# Patient Record
Sex: FEMALE | Race: BLACK OR AFRICAN AMERICAN | Employment: FULL TIME | URBAN - METROPOLITAN AREA
[De-identification: names, ages, dates, MRNs, and addresses within clinical notes are randomized per-mention and may not be internally consistent; named-entity substitution may affect disease eponyms.]

---

## 2017-10-27 ENCOUNTER — HOSPITAL ENCOUNTER (EMERGENCY)
Age: 27
Discharge: HOME | End: 2017-10-27
Attending: EMERGENCY MEDICINE
Payer: MEDICAID

## 2017-10-27 VITALS
HEART RATE: 74 BPM | RESPIRATION RATE: 16 BRPM | BODY MASS INDEX: 38.98 KG/M2 | WEIGHT: 220 LBS | DIASTOLIC BLOOD PRESSURE: 86 MMHG | HEIGHT: 63 IN | SYSTOLIC BLOOD PRESSURE: 138 MMHG | OXYGEN SATURATION: 100 % | TEMPERATURE: 98 F

## 2017-10-27 DIAGNOSIS — R51.9 FRONTAL HEADACHE: Primary | ICD-10-CM

## 2017-10-27 DIAGNOSIS — R03.0 ELEVATED BLOOD PRESSURE READING: ICD-10-CM

## 2017-10-27 PROCEDURE — 96375 TX/PRO/DX INJ NEW DRUG ADDON: CPT

## 2017-10-27 PROCEDURE — 96374 THER/PROPH/DIAG INJ IV PUSH: CPT

## 2017-10-27 PROCEDURE — 96361 HYDRATE IV INFUSION ADD-ON: CPT

## 2017-10-27 PROCEDURE — 74011250636 HC RX REV CODE- 250/636: Performed by: NURSE PRACTITIONER

## 2017-10-27 PROCEDURE — 99282 EMERGENCY DEPT VISIT SF MDM: CPT

## 2017-10-27 RX ORDER — BUTALBITAL, ACETAMINOPHEN AND CAFFEINE 300; 40; 50 MG/1; MG/1; MG/1
1 CAPSULE ORAL
Qty: 20 CAP | Refills: 0 | Status: SHIPPED | OUTPATIENT
Start: 2017-10-27 | End: 2018-06-18

## 2017-10-27 RX ORDER — AMLODIPINE BESYLATE 10 MG/1
TABLET ORAL
Refills: 0 | COMMUNITY
Start: 2017-08-13 | End: 2017-10-27

## 2017-10-27 RX ORDER — AMLODIPINE BESYLATE 10 MG/1
10 TABLET ORAL DAILY
Qty: 30 TAB | Refills: 1 | Status: SHIPPED | OUTPATIENT
Start: 2017-10-27 | End: 2018-06-05 | Stop reason: SDUPTHER

## 2017-10-27 RX ORDER — DIPHENHYDRAMINE HYDROCHLORIDE 50 MG/ML
25 INJECTION, SOLUTION INTRAMUSCULAR; INTRAVENOUS ONCE
Status: COMPLETED | OUTPATIENT
Start: 2017-10-27 | End: 2017-10-27

## 2017-10-27 RX ORDER — KETOROLAC TROMETHAMINE 30 MG/ML
15 INJECTION, SOLUTION INTRAMUSCULAR; INTRAVENOUS
Status: COMPLETED | OUTPATIENT
Start: 2017-10-27 | End: 2017-10-27

## 2017-10-27 RX ORDER — METOCLOPRAMIDE HYDROCHLORIDE 5 MG/ML
10 INJECTION INTRAMUSCULAR; INTRAVENOUS
Status: COMPLETED | OUTPATIENT
Start: 2017-10-27 | End: 2017-10-27

## 2017-10-27 RX ADMIN — METOCLOPRAMIDE 10 MG: 5 INJECTION, SOLUTION INTRAMUSCULAR; INTRAVENOUS at 21:31

## 2017-10-27 RX ADMIN — DIPHENHYDRAMINE HYDROCHLORIDE 25 MG: 50 INJECTION, SOLUTION INTRAMUSCULAR; INTRAVENOUS at 21:31

## 2017-10-27 RX ADMIN — KETOROLAC TROMETHAMINE 15 MG: 30 INJECTION, SOLUTION INTRAMUSCULAR at 21:31

## 2017-10-27 RX ADMIN — SODIUM CHLORIDE 1000 ML: 900 INJECTION, SOLUTION INTRAVENOUS at 21:32

## 2017-10-28 NOTE — ED NOTES
Pt discharged to home ambulatory and in company of mother  Discharge instructions provided via discussion and handout. Teaching to patient. Verbalized understanding. No questions voiced. Discharged with 2 RX.

## 2017-10-28 NOTE — ED PROVIDER NOTES
HPI Comments: 8:13 PM   28 y.o. female presents to ED C/O headache. Patient has a HX of HTN. Patient reports frontal headache x 2 days, does not improve with alieve which makes this headache different from those in the past.  patient repots mild photophobia associated with headache. Patient denies vision changes, N/V/D, dizziness, CP, SOB, fever, nuchal rigidity. Reports nasal congestion last night, no appetite change. LMP 10/19, denies risk of pregnancy. Patient is a nonsmoker. Patient denies any other symptoms or complaints. The history is provided by the patient. History limited by: No language barrier. Past Medical History:   Diagnosis Date    Hypertension        History reviewed. No pertinent surgical history. History reviewed. No pertinent family history. Social History     Social History    Marital status: SINGLE     Spouse name: N/A    Number of children: N/A    Years of education: N/A     Occupational History    Not on file. Social History Main Topics    Smoking status: Never Smoker    Smokeless tobacco: Never Used    Alcohol use No    Drug use: Not on file    Sexual activity: Not on file     Other Topics Concern    Not on file     Social History Narrative         ALLERGIES: Review of patient's allergies indicates no known allergies. Review of Systems   Constitutional: Negative for appetite change and fever. HENT: Positive for congestion. Negative for ear pain, rhinorrhea, sinus pressure and sore throat. Eyes: Positive for photophobia. Negative for visual disturbance. Respiratory: Negative for cough, shortness of breath and wheezing. Cardiovascular: Negative for chest pain and leg swelling. Gastrointestinal: Negative for abdominal pain, constipation, diarrhea, nausea and vomiting. Genitourinary: Negative for dysuria. Musculoskeletal: Negative for arthralgias and back pain. Neurological: Positive for headaches.  Negative for dizziness, syncope, light-headedness and numbness. Vitals:    10/27/17 2005 10/27/17 2207   BP: (!) 157/110 138/86   Pulse: 80 74   Resp: 16    Temp: 98 °F (36.7 °C)    SpO2: 100%    Weight: 99.8 kg (220 lb)    Height: 5' 3\" (1.6 m)             Physical Exam   Constitutional: She appears well-developed and well-nourished. No distress. HENT:   Right Ear: Hearing and external ear normal.   Left Ear: Hearing and external ear normal.   Nose: Right sinus exhibits frontal sinus tenderness. Right sinus exhibits no maxillary sinus tenderness. Left sinus exhibits no maxillary sinus tenderness and no frontal sinus tenderness. Mouth/Throat: Oropharynx is clear and moist.   Eyes: Conjunctivae and EOM are normal. Pupils are equal, round, and reactive to light. No nystagmus noted    Neck: Normal range of motion. Neck supple. No rigidity. Cardiovascular: Normal rate, regular rhythm and normal heart sounds. Pulmonary/Chest: Effort normal and breath sounds normal. No respiratory distress. She has no wheezes. She has no rales. Musculoskeletal: Normal range of motion. Neurological: She is alert. She has normal strength. No sensory deficit. She exhibits normal muscle tone. She displays no seizure activity. Coordination and gait normal. GCS eye subscore is 4. GCS verbal subscore is 5. GCS motor subscore is 6. Cranial nerves 2-12 checked and are intact, no unilateral weakness,  strength equal bilaterally   Skin: Skin is warm and dry. No rash noted. She is not diaphoretic. No erythema. No pallor. Nursing note and vitals reviewed.        MDM  Number of Diagnoses or Management Options  Elevated blood pressure reading:   Frontal headache:   Diagnosis management comments: Differential Diagnosis: tension HA, cluster HA, migraine, sinusitis, otitis media, SAH, epidural hematoma, subdural hematoma, temporal arteritis, brain tumor, trigeminal neuralgia, CVA, meningitis, encephalitis, TMJ, normal pressure hydrocephalus      Plan: migraine cocktail, patient is not ill appearing, no indication for lab work at this time, headache symptoms not consistent with concerning red flag findings, no indication for imagining at this time, will treat for pain and re-evaluate. 9:48 PM Patient as not been taking blood pressure medication x 3 weeks, because she was running low on it. Will refill prior to discharge and refer to PCP for further evaluation. 10:15 PM Patient reports headache has resolved, denies pain or photophobia at this time, patient's blood pressure is also now WNL. Patient educated about importance of medication compliance and PCP follow-up. Patient reports she understands. Patient educated about red flag symptoms of headache and reasons to return to the ED. Patient denies questions. -patient's mother is driving her home. ED Course       Procedures             RESULTS:    No orders to display       Labs Reviewed - No data to display    No results found for this or any previous visit (from the past 12 hour(s)). PROGRESS NOTE:   8:13 PM   Initial assessment completed. Written by Amy QUINONES    One or more blood pressure readings were noted elevated during the Pt's presentation in the emergency department this date. This abnormal reading has been cited in the Pt's diagnosis, and they have been encouraged to follow up with their primary care physician, or referred to a consultant for further evaluation and treatment. DISCHARGE NOTE:  10:16 PM   Jimmie Medico  results have been reviewed with her. She has been counseled regarding her diagnosis, treatment, and plan. She verbally conveys understanding and agreement of the signs, symptoms, diagnosis, treatment and prognosis and additionally agrees to follow up as discussed. She also agrees with the care-plan and conveys that all of her questions have been answered.   I have also provided discharge instructions for her that include: educational information regarding their diagnosis and treatment, and list of reasons why they would want to return to the ED prior to their follow-up appointment, should her condition change. CLINICAL IMPRESSION:    1. Frontal headache    2. Elevated blood pressure reading        AFTER VISIT PLAN:    Current Discharge Medication List      START taking these medications    Details   butalbital-acetaminophen-caff (FIORICET) -40 mg per capsule Take 1 Cap by mouth every four (4) hours as needed for Pain. Max Daily Amount: 6 Caps. Qty: 20 Cap, Refills: 0         CONTINUE these medications which have CHANGED    Details   amLODIPine (NORVASC) 10 mg tablet Take 1 Tab by mouth daily.   Qty: 30 Tab, Refills: 1              Follow-up Information     Follow up With Details Comments [facility] MD [facility] Schedule an appointment as soon as possible for a visit in 1 week Further evaluation [address] [place] [address] [phone]  [phone]      [facility] EMERGENCY DEPT Go in 1 day If symptoms worsen/ not improved  [address]  [phone]           Written by Marco QUINONES

## 2017-10-28 NOTE — DISCHARGE INSTRUCTIONS
Headache: Care Instructions  Your Care Instructions    Headaches have many possible causes. Most headaches aren't a sign of a more serious problem, and they will get better on their own. Home treatment may help you feel better faster. The doctor has checked you carefully, but problems can develop later. If you notice any problems or new symptoms, get medical treatment right away. Follow-up care is a key part of your treatment and safety. Be sure to make and go to all appointments, and call your doctor if you are having problems. It's also a good idea to know your test results and keep a list of the medicines you take. How can you care for yourself at home? · Do not drive if you have taken a prescription pain medicine. · Rest in a quiet, dark room until your headache is gone. Close your eyes and try to relax or go to sleep. Don't watch TV or read. · Put a cold, moist cloth or cold pack on the painful area for 10 to 20 minutes at a time. Put a thin cloth between the cold pack and your skin. · Use a warm, moist towel or a heating pad set on low to relax tight shoulder and neck muscles. · Have someone gently massage your neck and shoulders. · Take pain medicines exactly as directed. ¨ If the doctor gave you a prescription medicine for pain, take it as prescribed. ¨ If you are not taking a prescription pain medicine, ask your doctor if you can take an over-the-counter medicine. · Be careful not to take pain medicine more often than the instructions allow, because you may get worse or more frequent headaches when the medicine wears off. · Do not ignore new symptoms that occur with a headache, such as a fever, weakness or numbness, vision changes, or confusion. These may be signs of a more serious problem. To prevent headaches  · Keep a headache diary so you can figure out what triggers your headaches. Avoiding triggers may help you prevent headaches.  Record when each headache began, how long it lasted, and what the pain was like (throbbing, aching, stabbing, or dull). Write down any other symptoms you had with the headache, such as nausea, flashing lights or dark spots, or sensitivity to bright light or loud noise. Note if the headache occurred near your period. List anything that might have triggered the headache, such as certain foods (chocolate, cheese, wine) or odors, smoke, bright light, stress, or lack of sleep. · Find healthy ways to deal with stress. Headaches are most common during or right after stressful times. Take time to relax before and after you do something that has caused a headache in the past.  · Try to keep your muscles relaxed by keeping good posture. Check your jaw, face, neck, and shoulder muscles for tension, and try relaxing them. When sitting at a desk, change positions often, and stretch for 30 seconds each hour. · Get plenty of sleep and exercise. · Eat regularly and well. Long periods without food can trigger a headache. · Treat yourself to a massage. Some people find that regular massages are very helpful in relieving tension. · Limit caffeine by not drinking too much coffee, tea, or soda. But don't quit caffeine suddenly, because that can also give you headaches. · Reduce eyestrain from computers by blinking frequently and looking away from the computer screen every so often. Make sure you have proper eyewear and that your monitor is set up properly, about an arm's length away. · Seek help if you have depression or anxiety. Your headaches may be linked to these conditions. Treatment can both prevent headaches and help with symptoms of anxiety or depression. When should you call for help? Call 911 anytime you think you may need emergency care. For example, call if:  ? · You have signs of a stroke. These may include:  ¨ Sudden numbness, paralysis, or weakness in your face, arm, or leg, especially on only one side of your body. ¨ Sudden vision changes.   ¨ Sudden trouble speaking. ¨ Sudden confusion or trouble understanding simple statements. ¨ Sudden problems with walking or balance. ¨ A sudden, severe headache that is different from past headaches. ?Call your doctor now or seek immediate medical care if:  ? · You have a new or worse headache. ? · Your headache gets much worse. Where can you learn more? Go to http://nicol-gurdeep.info/. Enter M271 in the search box to learn more about \"Headache: Care Instructions. \"  Current as of: October 14, 2016  Content Version: 11.4  © 0565-9091 [employer]. Care instructions adapted under license by [employer] (which disclaims liability or warranty for this information). If you have questions about a medical condition or this instruction, always ask your healthcare professional. [address] any warranty or liability for your use of this information. Elevated Blood Pressure: Care Instructions  Your Care Instructions    Blood pressure is a measure of how hard the blood pushes against the walls of your arteries. It's normal for blood pressure to go up and down throughout the day. But if it stays up over time, you have high blood pressure. Two numbers tell you your blood pressure. The first number is the systolic pressure. It shows how hard the blood pushes when your heart is pumping. The second number is the diastolic pressure. It shows how hard the blood pushes between heartbeats, when your heart is relaxed and filling with blood. An ideal blood pressure in adults is less than 120/80 (say \"120 over 80\"). High blood pressure is 140/90 or higher. You have high blood pressure if your top number is 140 or higher or your bottom number is 90 or higher, or both. The main test for high blood pressure is simple, fast, and painless. To diagnose high blood pressure, your doctor will test your blood pressure at different times.  After testing your blood pressure, your doctor may ask you to test it again when you are home. If you are diagnosed with high blood pressure, you can work with your doctor to make a long-term plan to manage it. Follow-up care is a key part of your treatment and safety. Be sure to make and go to all appointments, and call your doctor if you are having problems. It's also a good idea to know your test results and keep a list of the medicines you take. How can you care for yourself at home? · Do not smoke. Smoking increases your risk for heart attack and stroke. If you need help quitting, talk to your doctor about stop-smoking programs and medicines. These can increase your chances of quitting for good. · Stay at a healthy weight. · Try to limit how much sodium you eat to less than 2,300 milligrams (mg) a day. Your doctor may ask you to try to eat less than 1,500 mg a day. · Be physically active. Get at least 30 minutes of exercise on most days of the week. Walking is a good choice. You also may want to do other activities, such as running, swimming, cycling, or playing tennis or team sports. · Avoid or limit alcohol. Talk to your doctor about whether you can drink any alcohol. · Eat plenty of fruits, vegetables, and low-fat dairy products. Eat less saturated and total fats. · Learn how to check your blood pressure at home. When should you call for help? Call your doctor now or seek immediate medical care if:  ? · Your blood pressure is much higher than normal (such as 180/110 or higher). ? · You think high blood pressure is causing symptoms such as:  ¨ Severe headache. ¨ Blurry vision. ? Watch closely for changes in your health, and be sure to contact your doctor if:  ? · You do not get better as expected. Where can you learn more? Go to http://nicol-gurdeep.info/. Enter E510 in the search box to learn more about \"Elevated Blood Pressure: Care Instructions. \"  Current as of: September 21, 2016  Content Version: 11.4  © 2475-7079 [employer]. Care instructions adapted under license by [employer] (which disclaims liability or warranty for this information). If you have questions about a medical condition or this instruction, always ask your healthcare professional. [place] [address] any warranty or liability for your use of this information.

## 2018-06-02 ENCOUNTER — HOSPITAL ENCOUNTER (EMERGENCY)
Age: 28
Discharge: HOME | End: 2018-06-03
Attending: EMERGENCY MEDICINE
Payer: MEDICAID

## 2018-06-02 DIAGNOSIS — R03.0 ELEVATED BLOOD PRESSURE READING: ICD-10-CM

## 2018-06-02 DIAGNOSIS — R10.13 ABDOMINAL PAIN, EPIGASTRIC: Primary | ICD-10-CM

## 2018-06-02 LAB
APPEARANCE UR: CLEAR
BILIRUB UR QL: NEGATIVE
COLOR UR: YELLOW
GLUCOSE UR STRIP.AUTO-MCNC: NEGATIVE MG/DL
HCG UR QL: NEGATIVE
HGB UR QL STRIP: NEGATIVE
KETONES UR QL STRIP.AUTO: NEGATIVE MG/DL
LEUKOCYTE ESTERASE UR QL STRIP.AUTO: NEGATIVE
NITRITE UR QL STRIP.AUTO: NEGATIVE
PH UR STRIP: 6.5 [PH] (ref 5–8)
PROT UR STRIP-MCNC: NEGATIVE MG/DL
SP GR UR REFRACTOMETRY: 1.02 (ref 1–1.03)
UROBILINOGEN UR QL STRIP.AUTO: 1 EU/DL (ref 0.2–1)

## 2018-06-02 PROCEDURE — 81003 URINALYSIS AUTO W/O SCOPE: CPT | Performed by: EMERGENCY MEDICINE

## 2018-06-02 PROCEDURE — 99284 EMERGENCY DEPT VISIT MOD MDM: CPT

## 2018-06-02 PROCEDURE — 81025 URINE PREGNANCY TEST: CPT | Performed by: EMERGENCY MEDICINE

## 2018-06-03 VITALS
HEART RATE: 69 BPM | RESPIRATION RATE: 19 BRPM | HEIGHT: 63 IN | OXYGEN SATURATION: 100 % | SYSTOLIC BLOOD PRESSURE: 167 MMHG | BODY MASS INDEX: 38.98 KG/M2 | DIASTOLIC BLOOD PRESSURE: 111 MMHG | WEIGHT: 220 LBS | TEMPERATURE: 98.5 F

## 2018-06-03 LAB
ALBUMIN SERPL-MCNC: 3.7 G/DL (ref 3.4–5)
ALBUMIN/GLOB SERPL: 0.9 {RATIO} (ref 0.8–1.7)
ALP SERPL-CCNC: 74 U/L (ref 45–117)
ALT SERPL-CCNC: 24 U/L (ref 13–56)
ANION GAP SERPL CALC-SCNC: 8 MMOL/L (ref 3–18)
AST SERPL-CCNC: 13 U/L (ref 15–37)
BASOPHILS # BLD: 0 K/UL (ref 0–0.06)
BASOPHILS NFR BLD: 0 % (ref 0–2)
BILIRUB SERPL-MCNC: 0.3 MG/DL (ref 0.2–1)
BUN SERPL-MCNC: 11 MG/DL (ref 7–18)
BUN/CREAT SERPL: 11 (ref 12–20)
CALCIUM SERPL-MCNC: 9 MG/DL (ref 8.5–10.1)
CHLORIDE SERPL-SCNC: 106 MMOL/L (ref 100–108)
CO2 SERPL-SCNC: 26 MMOL/L (ref 21–32)
CREAT SERPL-MCNC: 0.97 MG/DL (ref 0.6–1.3)
DIFFERENTIAL METHOD BLD: NORMAL
EOSINOPHIL # BLD: 0.2 K/UL (ref 0–0.4)
EOSINOPHIL NFR BLD: 2 % (ref 0–5)
ERYTHROCYTE [DISTWIDTH] IN BLOOD BY AUTOMATED COUNT: 14.3 % (ref 11.6–14.5)
GLOBULIN SER CALC-MCNC: 3.9 G/DL (ref 2–4)
GLUCOSE SERPL-MCNC: 114 MG/DL (ref 74–99)
HCT VFR BLD AUTO: 38.3 % (ref 35–45)
HGB BLD-MCNC: 13 G/DL (ref 12–16)
LIPASE SERPL-CCNC: 114 U/L (ref 73–393)
LYMPHOCYTES # BLD: 2.4 K/UL (ref 0.9–3.6)
LYMPHOCYTES NFR BLD: 33 % (ref 21–52)
MAGNESIUM SERPL-MCNC: 2.1 MG/DL (ref 1.6–2.6)
MCH RBC QN AUTO: 27.7 PG (ref 24–34)
MCHC RBC AUTO-ENTMCNC: 33.9 G/DL (ref 31–37)
MCV RBC AUTO: 81.5 FL (ref 74–97)
MONOCYTES # BLD: 0.6 K/UL (ref 0.05–1.2)
MONOCYTES NFR BLD: 9 % (ref 3–10)
NEUTS SEG # BLD: 4.1 K/UL (ref 1.8–8)
NEUTS SEG NFR BLD: 56 % (ref 40–73)
PLATELET # BLD AUTO: 364 K/UL (ref 135–420)
PMV BLD AUTO: 10.2 FL (ref 9.2–11.8)
POTASSIUM SERPL-SCNC: 3.8 MMOL/L (ref 3.5–5.5)
PROT SERPL-MCNC: 7.6 G/DL (ref 6.4–8.2)
RBC # BLD AUTO: 4.7 M/UL (ref 4.2–5.3)
SODIUM SERPL-SCNC: 140 MMOL/L (ref 136–145)
WBC # BLD AUTO: 7.4 K/UL (ref 4.6–13.2)

## 2018-06-03 PROCEDURE — 80053 COMPREHEN METABOLIC PANEL: CPT | Performed by: EMERGENCY MEDICINE

## 2018-06-03 PROCEDURE — 83690 ASSAY OF LIPASE: CPT | Performed by: EMERGENCY MEDICINE

## 2018-06-03 PROCEDURE — 74011250637 HC RX REV CODE- 250/637: Performed by: EMERGENCY MEDICINE

## 2018-06-03 PROCEDURE — 74011000250 HC RX REV CODE- 250: Performed by: EMERGENCY MEDICINE

## 2018-06-03 PROCEDURE — 83735 ASSAY OF MAGNESIUM: CPT | Performed by: EMERGENCY MEDICINE

## 2018-06-03 PROCEDURE — 85025 COMPLETE CBC W/AUTO DIFF WBC: CPT | Performed by: EMERGENCY MEDICINE

## 2018-06-03 RX ORDER — OMEPRAZOLE 20 MG/1
20 CAPSULE, DELAYED RELEASE ORAL DAILY
Qty: 28 CAP | Refills: 0 | Status: SHIPPED | OUTPATIENT
Start: 2018-06-03 | End: 2018-06-17

## 2018-06-03 RX ORDER — ONDANSETRON 4 MG/1
4 TABLET, ORALLY DISINTEGRATING ORAL
Qty: 15 TAB | Refills: 0 | Status: SHIPPED | OUTPATIENT
Start: 2018-06-03 | End: 2018-06-18

## 2018-06-03 RX ADMIN — PHENOBARBITAL, HYOSCYAMINE SULFATE, ATROPINE SULFATE, SCOPOLAMINE HYDROBROMIDE 50 ML: 16.2; .1037; .0194; .0065 ELIXIR ORAL at 02:11

## 2018-06-03 NOTE — DISCHARGE INSTRUCTIONS
Abdominal Pain: Care Instructions  Your Care Instructions    Abdominal pain has many possible causes. Some aren't serious and get better on their own in a few days. Others need more testing and treatment. If your pain continues or gets worse, you need to be rechecked and may need more tests to find out what is wrong. You may need surgery to correct the problem. Don't ignore new symptoms, such as fever, nausea and vomiting, urination problems, pain that gets worse, and dizziness. These may be signs of a more serious problem. Your doctor may have recommended a follow-up visit in the next 8 to 12 hours. If you are not getting better, you may need more tests or treatment. The doctor has checked you carefully, but problems can develop later. If you notice any problems or new symptoms, get medical treatment right away. Follow-up care is a key part of your treatment and safety. Be sure to make and go to all appointments, and call your doctor if you are having problems. It's also a good idea to know your test results and keep a list of the medicines you take. How can you care for yourself at home? · Rest until you feel better. · To prevent dehydration, drink plenty of fluids, enough so that your urine is light yellow or clear like water. Choose water and other caffeine-free clear liquids until you feel better. If you have kidney, heart, or liver disease and have to limit fluids, talk with your doctor before you increase the amount of fluids you drink. · If your stomach is upset, eat mild foods, such as rice, dry toast or crackers, bananas, and applesauce. Try eating several small meals instead of two or three large ones. · Wait until 48 hours after all symptoms have gone away before you have spicy foods, alcohol, and drinks that contain caffeine. · Do not eat foods that are high in fat. · Avoid anti-inflammatory medicines such as aspirin, ibuprofen (Advil, Motrin), and naproxen (Aleve).  These can cause stomach upset. Talk to your doctor if you take daily aspirin for another health problem. When should you call for help? Call 911 anytime you think you may need emergency care. For example, call if:  ? · You passed out (lost consciousness). ? · You pass maroon or very bloody stools. ? · You vomit blood or what looks like coffee grounds. ? · You have new, severe belly pain. ?Call your doctor now or seek immediate medical care if:  ? · Your pain gets worse, especially if it becomes focused in one area of your belly. ? · You have a new or higher fever. ? · Your stools are black and look like tar, or they have streaks of blood. ? · You have unexpected vaginal bleeding. ? · You have symptoms of a urinary tract infection. These may include:  ¨ Pain when you urinate. ¨ Urinating more often than usual.  ¨ Blood in your urine. ? · You are dizzy or lightheaded, or you feel like you may faint. ? Watch closely for changes in your health, and be sure to contact your doctor if:  ? · You are not getting better after 1 day (24 hours). Where can you learn more? Go to http://nicol-gurdeep.info/. Enter S741 in the search box to learn more about \"Abdominal Pain: Care Instructions. \"  Current as of: March 20, 2017  Content Version: 11.4  © 9424-0682 [employer]. Care instructions adapted under license by [employer] (which disclaims liability or warranty for this information). If you have questions about a medical condition or this instruction, always ask your healthcare professional. [place] [address] any warranty or liability for your use of this information. Elevated Blood Pressure: Care Instructions  Your Care Instructions    Blood pressure is a measure of how hard the blood pushes against the walls of your arteries. It's normal for blood pressure to go up and down throughout the day.  But if it stays up over time, you have high blood pressure. Two numbers tell you your blood pressure. The first number is the systolic pressure. It shows how hard the blood pushes when your heart is pumping. The second number is the diastolic pressure. It shows how hard the blood pushes between heartbeats, when your heart is relaxed and filling with blood. An ideal blood pressure in adults is less than 120/80 (say \"120 over 80\"). High blood pressure is 140/90 or higher. You have high blood pressure if your top number is 140 or higher or your bottom number is 90 or higher, or both. The main test for high blood pressure is simple, fast, and painless. To diagnose high blood pressure, your doctor will test your blood pressure at different times. After testing your blood pressure, your doctor may ask you to test it again when you are home. If you are diagnosed with high blood pressure, you can work with your doctor to make a long-term plan to manage it. Follow-up care is a key part of your treatment and safety. Be sure to make and go to all appointments, and call your doctor if you are having problems. It's also a good idea to know your test results and keep a list of the medicines you take. How can you care for yourself at home? · Do not smoke. Smoking increases your risk for heart attack and stroke. If you need help quitting, talk to your doctor about stop-smoking programs and medicines. These can increase your chances of quitting for good. · Stay at a healthy weight. · Try to limit how much sodium you eat to less than 2,300 milligrams (mg) a day. Your doctor may ask you to try to eat less than 1,500 mg a day. · Be physically active. Get at least 30 minutes of exercise on most days of the week. Walking is a good choice. You also may want to do other activities, such as running, swimming, cycling, or playing tennis or team sports. · Avoid or limit alcohol. Talk to your doctor about whether you can drink any alcohol.   · Eat plenty of fruits, vegetables, and low-fat dairy products. Eat less saturated and total fats. · Learn how to check your blood pressure at home. When should you call for help? Call your doctor now or seek immediate medical care if:  ? · Your blood pressure is much higher than normal (such as 180/110 or higher). ? · You think high blood pressure is causing symptoms such as:  ¨ Severe headache. ¨ Blurry vision. ? Watch closely for changes in your health, and be sure to contact your doctor if:  ? · You do not get better as expected. Where can you learn more? Go to http://nicol-gurdeep.info/. Enter D234 in the search box to learn more about \"Elevated Blood Pressure: Care Instructions. \"  Current as of: September 21, 2016  Content Version: 11.4  © 8018-5176 [employer]. Care instructions adapted under license by [employer] (which disclaims liability or warranty for this information). If you have questions about a medical condition or this instruction, always ask your healthcare professional. [address] any warranty or liability for your use of this information.

## 2018-06-03 NOTE — ED PROVIDER NOTES
EMERGENCY DEPARTMENT HISTORY AND PHYSICAL EXAM    1:30 AM      Date: 6/2/2018  Patient Name: Jose Luis Hurst    History of Presenting Illness     Chief Complaint   Patient presents with    Abdominal Pain         History Provided By: Patient    Chief Complaint: abd pain   Duration:  3 days  Timing:  intermittent  Location: epigastric region  Quality: Burning  Severity: Moderate  Modifying Factors: triggered 10-20 minutes after eating, worse with eating fatty/spicy/acidic food,better with laying down  Associated Symptoms: esophageal discomfort after eating      Additional History (Context): Jose Luis Hurst, a 28 y.o. Female, nonsmoker, non-alcohol drinker with h/o HTN, presents to the ED c/o intermittent, moderate severity, burning epigastric pain that normally starts 10-20 minutes after eating. Sx, associated with esophageal discomfort but not with n/v, have been ongoing for 3 days. Sx worsen with eating fatty/spicy/acidic food and improve with laying down. Pt admits to having taken zantac in the past (1 month ago when she did not have epigastric pain) but states that she has not recently tried any antiacid medication. She also presents with elevated blood pressure today and states that she has been noncompliant because her HTN medication is too strong and gives her a HA. Pt's LMP was 1 week ago. She does not currently have any cough, cold sx, vaginal pain, vaginal discharge or v/d. She has a PCP. PCP: Genie Sales NP    Current Outpatient Prescriptions   Medication Sig Dispense Refill    omeprazole (PRILOSEC) 20 mg capsule Take 1 Cap by mouth daily for 14 days. 28 Cap 0    ondansetron (ZOFRAN ODT) 4 mg disintegrating tablet Take 1 Tab by mouth every eight (8) hours as needed for Nausea. 15 Tab 0    amLODIPine (NORVASC) 10 mg tablet Take 1 Tab by mouth daily. 30 Tab 1    butalbital-acetaminophen-caff (FIORICET) -40 mg per capsule Take 1 Cap by mouth every four (4) hours as needed for Pain. Max Daily Amount: 6 Caps. 20 Cap 0       Past History     Past Medical History:  Past Medical History:   Diagnosis Date    Hypertension        Past Surgical History:  Past Surgical History:   Procedure Laterality Date    HX HEENT         Family History:  History reviewed. No pertinent family history. Social History:  Social History   Substance Use Topics    Smoking status: Never Smoker    Smokeless tobacco: Never Used    Alcohol use No       Allergies:  No Known Allergies      Review of Systems       Review of Systems   Constitutional: Negative for chills and fever. HENT: Negative for congestion, ear pain, rhinorrhea and sore throat. Eyes: Negative for pain and visual disturbance. Respiratory: Negative for cough and shortness of breath. Cardiovascular: Negative for chest pain and palpitations. Gastrointestinal: Positive for abdominal pain. Negative for diarrhea, nausea and vomiting. Genitourinary: Negative for flank pain, vaginal bleeding, vaginal discharge and vaginal pain. Musculoskeletal: Negative for back pain and neck pain. Neurological: Negative for syncope and headaches. Psychiatric/Behavioral: Negative for agitation. The patient is not nervous/anxious. All other systems reviewed and are negative.         Physical Exam     Visit Vitals    BP (!) 167/111    Pulse 69    Temp 98.5 °F (36.9 °C)    Resp 19    Ht 5' 3\" (1.6 m)    Wt 99.8 kg (220 lb)    SpO2 100%    BMI 38.97 kg/m2         Physical Exam      Diagnostic Study Results     Labs -  Recent Results (from the past 12 hour(s))   URINALYSIS W/ RFLX MICROSCOPIC    Collection Time: 06/02/18 11:30 PM   Result Value Ref Range    Color YELLOW      Appearance CLEAR      Specific gravity 1.017 1.005 - 1.030      pH (UA) 6.5 5.0 - 8.0      Protein NEGATIVE  NEG mg/dL    Glucose NEGATIVE  NEG mg/dL    Ketone NEGATIVE  NEG mg/dL    Bilirubin NEGATIVE  NEG      Blood NEGATIVE  NEG      Urobilinogen 1.0 0.2 - 1.0 EU/dL    Nitrites NEGATIVE  NEG      Leukocyte Esterase NEGATIVE  NEG     HCG URINE, QL    Collection Time: 06/02/18 11:33 PM   Result Value Ref Range    HCG urine, QL NEGATIVE  NEG     CBC WITH AUTOMATED DIFF    Collection Time: 06/03/18 12:10 AM   Result Value Ref Range    WBC 7.4 4.6 - 13.2 K/uL    RBC 4.70 4.20 - 5.30 M/uL    HGB 13.0 12.0 - 16.0 g/dL    HCT 38.3 35.0 - 45.0 %    MCV 81.5 74.0 - 97.0 FL    MCH 27.7 24.0 - 34.0 PG    MCHC 33.9 31.0 - 37.0 g/dL    RDW 14.3 11.6 - 14.5 %    PLATELET 892 722 - 710 K/uL    MPV 10.2 9.2 - 11.8 FL    NEUTROPHILS 56 40 - 73 %    LYMPHOCYTES 33 21 - 52 %    MONOCYTES 9 3 - 10 %    EOSINOPHILS 2 0 - 5 %    BASOPHILS 0 0 - 2 %    ABS. NEUTROPHILS 4.1 1.8 - 8.0 K/UL    ABS. LYMPHOCYTES 2.4 0.9 - 3.6 K/UL    ABS. MONOCYTES 0.6 0.05 - 1.2 K/UL    ABS. EOSINOPHILS 0.2 0.0 - 0.4 K/UL    ABS. BASOPHILS 0.0 0.0 - 0.06 K/UL    DF AUTOMATED     METABOLIC PANEL, COMPREHENSIVE    Collection Time: 06/03/18 12:10 AM   Result Value Ref Range    Sodium 140 136 - 145 mmol/L    Potassium 3.8 3.5 - 5.5 mmol/L    Chloride 106 100 - 108 mmol/L    CO2 26 21 - 32 mmol/L    Anion gap 8 3.0 - 18 mmol/L    Glucose 114 (H) 74 - 99 mg/dL    BUN 11 7.0 - 18 MG/DL    Creatinine 0.97 0.6 - 1.3 MG/DL    BUN/Creatinine ratio 11 (L) 12 - 20      GFR est AA >60 >60 ml/min/1.73m2    GFR est non-AA >60 >60 ml/min/1.73m2    Calcium 9.0 8.5 - 10.1 MG/DL    Bilirubin, total 0.3 0.2 - 1.0 MG/DL    ALT (SGPT) 24 13 - 56 U/L    AST (SGOT) 13 (L) 15 - 37 U/L    Alk.  phosphatase 74 45 - 117 U/L    Protein, total 7.6 6.4 - 8.2 g/dL    Albumin 3.7 3.4 - 5.0 g/dL    Globulin 3.9 2.0 - 4.0 g/dL    A-G Ratio 0.9 0.8 - 1.7     LIPASE    Collection Time: 06/03/18 12:10 AM   Result Value Ref Range    Lipase 114 73 - 393 U/L   MAGNESIUM    Collection Time: 06/03/18 12:10 AM   Result Value Ref Range    Magnesium 2.1 1.6 - 2.6 mg/dL       Radiologic Studies -   No orders to display         Medical Decision Making   I am the first provider for this patient. I reviewed the vital signs, available nursing notes, past medical history, past surgical history, family history and social history. Vital Signs-Reviewed the patient's vital signs. Pulse Oximetry Analysis -  100% on room air (Interpretation) Normal     Records Reviewed: Nursing Notes and Old Medical Records (Time of Review: 1:30 AM)    ED Course: Progress Notes, Reevaluation, and Consults:    Physical exam:  General:  Well-developed, well-nourished, no apparent distress. Head:  Normocephalic atraumatic. Eyes:  Pupils midrange extraocular movements intact. No pallor or conjunctival injection. Nose:  No rhinorrhea, inspection grossly normal.    Ears:  Grossly normal to inspection, no discharge. Mouth:  Mucous membranes moist, no appreciable intraoral lesion. Neck/Back:  Trachea midline, no asymmetry. Chest:  Grossly normal inspection, symmetric chest rise. Pulmonary:  Clear to auscultation bilaterally no wheezes rhonchi or rales. Cardiovascular:  S1-S2 no murmurs rubs or gallops. Abdomen: Soft, nontender, nondistended no guarding rebound or peritoneal signs. No CVA tenderness  Extremities:  Grossly normal to inspection, peripheral pulses intact    Neurologic:  Alert and oriented no appreciable focal neurologic deficit. Skin:  Warm and dry  Psychiatric:  Grossly normal mood and affect. Nursing note reviewed, vital signs reviewed. ED course:  Patient presents with epigastric pain after eating, also elevated blood pressure did not comply with her own antihypertensive medication due to side effects. She has no cardiac for her symptoms no concern for PE are low pneumonia. No RIGHT upper quadrant pain that negative Parra sign. Grace Pearce a few hepatobiliary pathology most likely is gastritis/ulcer. Labs unremarkable    Given GI cocktail, strict return precautions and she does have her own antihypertensive medication to take.     Patient's presentation, physical exam and workup reviewed. Discussed the diagnostic uncertainty of abdominal pain. No further testing is indicated in the ER on this visit. Discussed possible empiric treatment for gastritis/ulcer with acid modifying medication and close follow up with PCP and possible referral for GI. Patient was comfortable with this plan, will return with persistent pain, vomiting, fever or with any concerns whatsoever. Disposition:    Discharged home      Portions of this chart were created with Dragon medical speech to text program.   Unrecognized errors may be present. Diagnosis     Clinical Impression:   1. Abdominal pain, epigastric    2. Elevated blood pressure reading        Disposition: Discharged    Follow-up Information     Follow up With Details Comments Uli Palomares MD Call in 2 days  [address] EMERGENCY DEPT  As needed, If symptoms worsen [address]  [phone]           Discharge Medication List as of 6/3/2018  1:59 AM      START taking these medications    Details   omeprazole (PRILOSEC) 20 mg capsule Take 1 Cap by mouth daily for 14 days. , Print, Disp-28 Cap, R-0      ondansetron (ZOFRAN ODT) 4 mg disintegrating tablet Take 1 Tab by mouth every eight (8) hours as needed for Nausea. , Print, Disp-15 Tab, R-0         CONTINUE these medications which have NOT CHANGED    Details   amLODIPine (NORVASC) 10 mg tablet Take 1 Tab by mouth daily. , Print, Disp-30 Tab, R-1      butalbital-acetaminophen-caff (FIORICET) -40 mg per capsule Take 1 Cap by mouth every four (4) hours as needed for Pain.  Max Daily Amount: 6 Caps., Print, Disp-20 Cap, R-0           _______________________________    Attestations:  Scribe Attestation     Princess Redding acting as a scribe for and in the presence of Phillip Avila MD      June 03, 2018 at 1:30 AM       Provider Attestation:      I personally performed the services described in the documentation, reviewed the documentation, as recorded by the scribe in my presence, and it accurately and completely records my words and actions.  June 03, 2018 at 1:30 AM - Jean Pierre Grigsby MD    _______________________________

## 2018-06-05 ENCOUNTER — OFFICE VISIT (OUTPATIENT)
Dept: FAMILY MEDICINE CLINIC | Age: 28
End: 2018-06-05

## 2018-06-05 VITALS
RESPIRATION RATE: 18 BRPM | HEART RATE: 83 BPM | TEMPERATURE: 97.3 F | SYSTOLIC BLOOD PRESSURE: 138 MMHG | DIASTOLIC BLOOD PRESSURE: 101 MMHG | BODY MASS INDEX: 38.62 KG/M2 | HEIGHT: 63 IN | WEIGHT: 218 LBS

## 2018-06-05 DIAGNOSIS — K21.9 GASTROESOPHAGEAL REFLUX DISEASE, ESOPHAGITIS PRESENCE NOT SPECIFIED: ICD-10-CM

## 2018-06-05 DIAGNOSIS — I10 ESSENTIAL HYPERTENSION: ICD-10-CM

## 2018-06-05 DIAGNOSIS — R73.9 ELEVATED BLOOD SUGAR LEVEL: Primary | ICD-10-CM

## 2018-06-05 LAB — HBA1C MFR BLD HPLC: 5.2 %

## 2018-06-05 RX ORDER — BUTALBITAL, ACETAMINOPHEN AND CAFFEINE 300; 40; 50 MG/1; MG/1; MG/1
1 CAPSULE ORAL
Qty: 20 CAP | Refills: 0 | Status: CANCELLED | OUTPATIENT
Start: 2018-06-05

## 2018-06-05 RX ORDER — AMLODIPINE BESYLATE 10 MG/1
10 TABLET ORAL DAILY
Qty: 90 TAB | Refills: 1 | Status: SHIPPED | OUTPATIENT
Start: 2018-06-05 | End: 2019-06-25 | Stop reason: SDUPTHER

## 2018-06-05 RX ORDER — HYDROCHLOROTHIAZIDE 25 MG/1
25 TABLET ORAL DAILY
Qty: 30 TAB | Refills: 1 | Status: SHIPPED | OUTPATIENT
Start: 2018-06-05 | End: 2018-09-28 | Stop reason: SDUPTHER

## 2018-06-05 NOTE — PROGRESS NOTES
[facility]    CC: EOC for HTN and GERD    HPI:   HTN:  - Does not check BP at home  - Taking Amlodipine as prescribed  - Reports that it sometime gives her headache  - Diet is unrestricted  - Not following any exercise regimen      GERD:  - Went to ED due to her uncontrolled GERD  - Not taking prescribed medication  - Not following lifestyle recommendations      ROS: Positive items marked in RED  CON: fever, chills  Cardiovascular: palpitations, CP  Resp: cough, SOB  : dysuria, hematuria      Past Medical History:   Diagnosis Date    Hypertension        Past Surgical History:   Procedure Laterality Date    HX HEENT         No family history on file. Social History     Social History    Marital status: UNKNOWN     Spouse name: N/A    Number of children: N/A    Years of education: N/A     Social History Main Topics    Smoking status: Never Smoker    Smokeless tobacco: Never Used    Alcohol use No    Drug use: No    Sexual activity: Not Asked     Other Topics Concern    None     Social History Narrative       No Known Allergies      Current Outpatient Prescriptions:     amLODIPine (NORVASC) 10 mg tablet, Take 1 Tab by mouth daily. , Disp: 30 Tab, Rfl: 1    omeprazole (PRILOSEC) 20 mg capsule, Take 1 Cap by mouth daily for 14 days. , Disp: 28 Cap, Rfl: 0    ondansetron (ZOFRAN ODT) 4 mg disintegrating tablet, Take 1 Tab by mouth every eight (8) hours as needed for Nausea., Disp: 15 Tab, Rfl: 0    butalbital-acetaminophen-caff (FIORICET) -40 mg per capsule, Take 1 Cap by mouth every four (4) hours as needed for Pain.  Max Daily Amount: 6 Caps., Disp: 20 Cap, Rfl: 0    Physical Exam:      BP (!) 138/101  Pulse 83  Temp 97.3 °F (36.3 °C) (Oral)   Resp 18  Ht 5' 3\" (1.6 m)  Wt 218 lb (98.9 kg)  LMP 05/20/2018 (Exact Date)  BMI 38.62 kg/m2    General:  Obese habitus, NAD  Eyes: sclera clear bilaterally, no discharge noted, eyelids normal in appearance  HENT: NCAT  Lungs: CTAB, Normal respiratory effort and rate  CV: RRR, no MRGs  ABD: soft, non-tender, non-distended, normal bowel sounds  Ext: no peripheral edema or digital cyanosis noted  Skin: normal temperature, turgor, color, and texture  Psych: alert and oriented to person, place and time, normal affect  Neuro: Speech normal, Moving all extremities, gait normal    Results for Chrissy Abraham (MRN [id number]):   Ref. Range 6/5/2018 14:58   Hemoglobin A1c (POC) Latest Units: % 5.2      Ref. Range 6/3/2018 00:10   WBC Latest Ref Range: 4.6 - 13.2 K/uL 7.4   RBC Latest Ref Range: 4.20 - 5.30 M/uL 4.70   HGB Latest Ref Range: 12.0 - 16.0 g/dL 13.0   HCT Latest Ref Range: 35.0 - 45.0 % 38.3   MCV Latest Ref Range: 74.0 - 97.0 FL 81.5   MCH Latest Ref Range: 24.0 - 34.0 PG 27.7   MCHC Latest Ref Range: 31.0 - 37.0 g/dL 33.9   RDW Latest Ref Range: 11.6 - 14.5 % 14.3   PLATELET Latest Ref Range: 135 - 420 K/uL 364   MPV Latest Ref Range: 9.2 - 11.8 FL 10.2   NEUTROPHILS Latest Ref Range: 40 - 73 % 56   LYMPHOCYTES Latest Ref Range: 21 - 52 % 33   MONOCYTES Latest Ref Range: 3 - 10 % 9   EOSINOPHILS Latest Ref Range: 0 - 5 % 2   BASOPHILS Latest Ref Range: 0 - 2 % 0   DF Latest Units:   AUTOMATED   ABS. NEUTROPHILS Latest Ref Range: 1.8 - 8.0 K/UL 4.1   ABS. LYMPHOCYTES Latest Ref Range: 0.9 - 3.6 K/UL 2.4   ABS. MONOCYTES Latest Ref Range: 0.05 - 1.2 K/UL 0.6   ABS. EOSINOPHILS Latest Ref Range: 0.0 - 0.4 K/UL 0.2   ABS.  BASOPHILS Latest Ref Range: 0.0 - 0.06 K/UL 0.0   Sodium Latest Ref Range: 136 - 145 mmol/L 140   Potassium Latest Ref Range: 3.5 - 5.5 mmol/L 3.8   Chloride Latest Ref Range: 100 - 108 mmol/L 106   CO2 Latest Ref Range: 21 - 32 mmol/L 26   Anion gap Latest Ref Range: 3.0 - 18 mmol/L 8   Glucose Latest Ref Range: 74 - 99 mg/dL 114 (H)   BUN Latest Ref Range: 7.0 - 18 MG/DL 11   Creatinine Latest Ref Range: 0.6 - 1.3 MG/DL 0.97   BUN/Creatinine ratio Latest Ref Range: 12 - 20   11 (L)   Calcium Latest Ref Range: 8.5 - 10.1 MG/DL 9.0   Magnesium Latest Ref Range: 1.6 - 2.6 mg/dL 2.1   GFR est non-AA Latest Ref Range: >60 ml/min/1.73m2 >60   GFR est AA Latest Ref Range: >60 ml/min/1.73m2 >60   Bilirubin, total Latest Ref Range: 0.2 - 1.0 MG/DL 0.3   Protein, total Latest Ref Range: 6.4 - 8.2 g/dL 7.6   Albumin Latest Ref Range: 3.4 - 5.0 g/dL 3.7   Globulin Latest Ref Range: 2.0 - 4.0 g/dL 3.9   A-G Ratio Latest Ref Range: 0.8 - 1.7   0.9   ALT (SGPT) Latest Ref Range: 13 - 56 U/L 24   AST Latest Ref Range: 15 - 37 U/L 13 (L)   Alk.  phosphatase Latest Ref Range: 45 - 117 U/L 74   Lipase Latest Ref Range: 73 - 393 U/L 114         Assessment/Plan     HTN, Inadequately Controlled:  - Will add on HCTZ to current BP regimen  - Patient counseled on lifestyle changes  - Will check urine microalbumin/Cr  - Handout given on DASH diet, low sodium diet, and reading food labels for sodium content  - Follow up in one month      GERD, Inadequately Controlled:  - Advised to use Prilosec  - Counseled on lifestyle changes  - Handout given on GERD care  - Follow up in one month        Arabella Zavaleta MD  6/5/2018, 1:22 PM

## 2018-06-05 NOTE — PATIENT INSTRUCTIONS
DASH Diet: Care Instructions  Your Care Instructions    The DASH diet is an eating plan that can help lower your blood pressure. DASH stands for Dietary Approaches to Stop Hypertension. Hypertension is high blood pressure. The DASH diet focuses on eating foods that are high in calcium, potassium, and magnesium. These nutrients can lower blood pressure. The foods that are highest in these nutrients are fruits, vegetables, low-fat dairy products, nuts, seeds, and legumes. But taking calcium, potassium, and magnesium supplements instead of eating foods that are high in those nutrients does not have the same effect. The DASH diet also includes whole grains, fish, and poultry. The DASH diet is one of several lifestyle changes your doctor may recommend to lower your high blood pressure. Your doctor may also want you to decrease the amount of sodium in your diet. Lowering sodium while following the DASH diet can lower blood pressure even further than just the DASH diet alone. Follow-up care is a key part of your treatment and safety. Be sure to make and go to all appointments, and call your doctor if you are having problems. It's also a good idea to know your test results and keep a list of the medicines you take. How can you care for yourself at home? Following the DASH diet  · Eat 4 to 5 servings of fruit each day. A serving is 1 medium-sized piece of fruit, ½ cup chopped or canned fruit, 1/4 cup dried fruit, or 4 ounces (½ cup) of fruit juice. Choose fruit more often than fruit juice. · Eat 4 to 5 servings of vegetables each day. A serving is 1 cup of lettuce or raw leafy vegetables, ½ cup of chopped or cooked vegetables, or 4 ounces (½ cup) of vegetable juice. Choose vegetables more often than vegetable juice. · Get 2 to 3 servings of low-fat and fat-free dairy each day. A serving is 8 ounces of milk, 1 cup of yogurt, or 1 ½ ounces of cheese. · Eat 6 to 8 servings of grains each day.  A serving is 1 slice of bread, 1 ounce of dry cereal, or ½ cup of cooked rice, pasta, or cooked cereal. Try to choose whole-grain products as much as possible. · Limit lean meat, poultry, and fish to 2 servings each day. A serving is 3 ounces, about the size of a deck of cards. · Eat 4 to 5 servings of nuts, seeds, and legumes (cooked dried beans, lentils, and split peas) each week. A serving is 1/3 cup of nuts, 2 tablespoons of seeds, or ½ cup of cooked beans or peas. · Limit fats and oils to 2 to 3 servings each day. A serving is 1 teaspoon of vegetable oil or 2 tablespoons of salad dressing. · Limit sweets and added sugars to 5 servings or less a week. A serving is 1 tablespoon jelly or jam, ½ cup sorbet, or 1 cup of lemonade. · Eat less than 2,300 milligrams (mg) of sodium a day. If you limit your sodium to 1,500 mg a day, you can lower your blood pressure even more. Tips for success  · Start small. Do not try to make dramatic changes to your diet all at once. You might feel that you are missing out on your favorite foods and then be more likely to not follow the plan. Make small changes, and stick with them. Once those changes become habit, add a few more changes. · Try some of the following:  ¨ Make it a goal to eat a fruit or vegetable at every meal and at snacks. This will make it easy to get the recommended amount of fruits and vegetables each day. ¨ Try yogurt topped with fruit and nuts for a snack or healthy dessert. ¨ Add lettuce, tomato, cucumber, and onion to sandwiches. ¨ Combine a ready-made pizza crust with low-fat mozzarella cheese and lots of vegetable toppings. Try using tomatoes, squash, spinach, broccoli, carrots, cauliflower, and onions. ¨ Have a variety of cut-up vegetables with a low-fat dip as an appetizer instead of chips and dip. ¨ Sprinkle sunflower seeds or chopped almonds over salads. Or try adding chopped walnuts or almonds to cooked vegetables.   ¨ Try some vegetarian meals using beans and peas. Add garbanzo or kidney beans to salads. Make burritos and tacos with mashed atkins beans or black beans. Where can you learn more? Go to http://nicol-gurdeep.info/. Enter X651 in the search box to learn more about \"DASH Diet: Care Instructions. \"  Current as of: September 21, 2016  Content Version: 11.4  © 7236-9627 [employer]. Care instructions adapted under license by [employer] (which disclaims liability or warranty for this information). If you have questions about a medical condition or this instruction, always ask your healthcare professional. [address] any warranty or liability for your use of this information. Low Sodium Diet (2,000 Milligram): Care Instructions  Your Care Instructions    Too much sodium causes your body to hold on to extra water. This can raise your blood pressure and force your heart and kidneys to work harder. In very serious cases, this could cause you to be put in the hospital. It might even be life-threatening. By limiting sodium, you will feel better and lower your risk of serious problems. The most common source of sodium is salt. People get most of the salt in their diet from canned, prepared, and packaged foods. Fast food and restaurant meals also are very high in sodium. Your doctor will probably limit your sodium to less than 2,000 milligrams (mg) a day. This limit counts all the sodium in prepared and packaged foods and any salt you add to your food. Follow-up care is a key part of your treatment and safety. Be sure to make and go to all appointments, and call your doctor if you are having problems. It's also a good idea to know your test results and keep a list of the medicines you take. How can you care for yourself at home? Read food labels  · Read labels on cans and food packages. The labels tell you how much sodium is in each serving. Make sure that you look at the serving size.  If you eat more than the serving size, you have eaten more sodium. · Food labels also tell you the Percent Daily Value for sodium. Choose products with low Percent Daily Values for sodium. · Be aware that sodium can come in forms other than salt, including monosodium glutamate (MSG), sodium citrate, and sodium bicarbonate (baking soda). MSG is often added to Asian food. When you eat out, you can sometimes ask for food without MSG or added salt. Buy low-sodium foods  · Buy foods that are labeled \"unsalted\" (no salt added), \"sodium-free\" (less than 5 mg of sodium per serving), or \"low-sodium\" (less than 140 mg of sodium per serving). Foods labeled \"reduced-sodium\" and \"light sodium\" may still have too much sodium. Be sure to read the label to see how much sodium you are getting. · Buy fresh vegetables, or frozen vegetables without added sauces. Buy low-sodium versions of canned vegetables, soups, and other canned goods. Prepare low-sodium meals  · Cut back on the amount of salt you use in cooking. This will help you adjust to the taste. Do not add salt after cooking. One teaspoon of salt has about 2,300 mg of sodium. · Take the salt shaker off the table. · Flavor your food with garlic, lemon juice, onion, vinegar, herbs, and spices. Do not use soy sauce, lite soy sauce, steak sauce, onion salt, garlic salt, celery salt, mustard, or ketchup on your food. · Use low-sodium salad dressings, sauces, and ketchup. Or make your own salad dressings and sauces without adding salt. · Use less salt (or none) when recipes call for it. You can often use half the salt a recipe calls for without losing flavor. Other foods such as rice, pasta, and grains do not need added salt. · Rinse canned vegetables, and cook them in fresh water. This removes some-but not all-of the salt. · Avoid water that is naturally high in sodium or that has been treated with water softeners, which add sodium.  Call your local water company to find out the sodium content of your water supply. If you buy bottled water, read the label and choose a sodium-free brand. Avoid high-sodium foods  · Avoid eating:  ¨ Smoked, cured, salted, and canned meat, fish, and poultry. ¨ Ham, wasserman, hot dogs, and luncheon meats. ¨ Regular, hard, and processed cheese and regular peanut butter. ¨ Crackers with salted tops, and other salted snack foods such as pretzels, chips, and salted popcorn. ¨ Frozen prepared meals, unless labeled low-sodium. ¨ Canned and dried soups, broths, and bouillon, unless labeled sodium-free or low-sodium. ¨ Canned vegetables, unless labeled sodium-free or low-sodium. ¨ [place] fries, pizza, tacos, and other fast foods. ¨ Pickles, olives, ketchup, and other condiments, especially soy sauce, unless labeled sodium-free or low-sodium. Where can you learn more? Go to http://nicol-gurdeep.info/. Enter K403 in the search box to learn more about \"Low Sodium Diet (2,000 Milligram): Care Instructions. \"  Current as of: May 12, 2017  Content Version: 11.4  © 3511-8161 [employer]. Care instructions adapted under license by [employer] (which disclaims liability or warranty for this information). If you have questions about a medical condition or this instruction, always ask your healthcare professional. [place] [address] any warranty or liability for your use of this information. How to Read a Food Label to Limit Sodium: Care Instructions  Your Care Instructions  Sodium causes your body to hold on to extra water. This can raise your blood pressure and force your heart and kidneys to work harder. In very serious cases, this could cause you to be put in the hospital. It might even be life-threatening. By limiting sodium, you will feel better and lower your risk of serious problems. Processed foods, fast food, and restaurant foods are the major sources of dietary sodium.  The most common name for sodium is salt. Try to limit how much sodium you eat to less than 2,300 milligrams (mg) a day. If you limit your sodium to 1,500 mg a day, you can lower your blood pressure even more. This limit counts all the salt that you eat in foods you cook or in packaged foods. Keep a list of everything you eat and drink. Follow-up care is a key part of your treatment and safety. Be sure to make and go to all appointments, and call your doctor if you are having problems. It's also a good idea to know your test results and keep a list of the medicines you take. How can you care for yourself at home? Read ingredient lists on food labels  · Read the list of ingredients on food labels to help you find how much sodium is in a food. The label lists the ingredients in a food in descending order (from the most to the least). If salt or sodium is high on the list, there may be a lot of sodium in the food. · Know that sodium has different names. Sodium is also called monosodium glutamate (MSG, common in [facility] food), sodium citrate, sodium alginate, sodium hydroxide, and sodium phosphate. Read Nutrition Facts labels  · On most foods, there is a Nutrition Facts label. This will tell you how much sodium is in one serving of food. Look at both the serving size and the sodium amount. The serving size is located at the top of the label, usually right under the \"Nutrition Facts\" title. The amount of sodium is given in the list under the title. It is given in milligrams (mg). ¨ Check the serving size carefully. A single serving is often very small, and you may eat more than one serving. If this is the case, you will eat more sodium than listed on the label. For example, if the serving size for a canned soup is 1 cup and the sodium amount is 470 mg, if you have 2 cups you will eat 940 mg of sodium. · The nutrition facts for fresh fruits and vegetables are not listed on the food. They may be listed somewhere in the store.  These foods usually have no sodium or low sodium. · The Nutrition Facts label also gives you the Percent Daily Value for sodium. This is how much of the recommended amount of sodium a serving contains. The daily value for sodium is less than 2,300 mg. So if the Percent Daily Value says 50%, this means one serving is giving you half of this, or 1,150 mg. Buy low-sodium foods  · Look for foods that are made with less sodium. Watch for the following words on the label. ¨ \"Unsalted\" means there is no sodium added to the food. But there may be sodium already in the food naturally. ¨ \"Sodium-free\" means a serving has less than 5 mg of sodium. ¨ \"Very low sodium\" means a serving has 35 mg or less of sodium. ¨ \"Low-sodium\" means a serving has 140 mg or less of sodium. · \"Reduced-sodium\" means that there is 25% less sodium than what the food normally has. This is still usually too much sodium. Try not to buy foods with this on the label. · Buy fresh vegetables, or frozen vegetables without added sauces. Buy low-sodium versions of canned vegetables, soups, and other canned goods. Where can you learn more? Go to http://nicol-gurdeep.info/. Enter 26 311355 in the search box to learn more about \"How to Read a Food Label to Limit Sodium: Care Instructions. \"  Current as of: May 12, 2017  Content Version: 11.4  © 9521-8791 [employer]. Care instructions adapted under license by [employer] (which disclaims liability or warranty for this information). If you have questions about a medical condition or this instruction, always ask your healthcare professional. [place] [address] any warranty or liability for your use of this information. Gastroesophageal Reflux Disease (GERD): Care Instructions  Your Care Instructions    Gastroesophageal reflux disease (GERD) is the backward flow of stomach acid into the esophagus.  The esophagus is the tube that leads from your throat to your stomach. A one-way valve prevents the stomach acid from moving up into this tube. When you have GERD, this valve does not close tightly enough. If you have mild GERD symptoms including heartburn, you may be able to control the problem with antacids or over-the-counter medicine. Changing your diet, losing weight, and making other lifestyle changes can also help reduce symptoms. Follow-up care is a key part of your treatment and safety. Be sure to make and go to all appointments, and call your doctor if you are having problems. It's also a good idea to know your test results and keep a list of the medicines you take. How can you care for yourself at home? · Take your medicines exactly as prescribed. Call your doctor if you think you are having a problem with your medicine. · Your doctor may recommend over-the-counter medicine. For mild or occasional indigestion, antacids, such as Tums, Gaviscon, Mylanta, or Maalox, may help. Your doctor also may recommend over-the-counter acid reducers, such as Pepcid AC, Tagamet HB, Zantac 75, or Prilosec. Read and follow all instructions on the label. If you use these medicines often, talk with your doctor. · Change your eating habits. ¨ It's best to eat several small meals instead of two or three large meals. ¨ After you eat, wait 2 to 3 hours before you lie down. ¨ Chocolate, mint, and alcohol can make GERD worse. ¨ Spicy foods, foods that have a lot of acid (like tomatoes and oranges), and coffee can make GERD symptoms worse in some people. If your symptoms are worse after you eat a certain food, you may want to stop eating that food to see if your symptoms get better. · Do not smoke or chew tobacco. Smoking can make GERD worse. If you need help quitting, talk to your doctor about stop-smoking programs and medicines. These can increase your chances of quitting for good.   · If you have GERD symptoms at night, raise the head of your bed 6 to 8 inches by putting the frame on blocks or placing a foam wedge under the head of your mattress. (Adding extra pillows does not work.)  · Do not wear tight clothing around your middle. · Lose weight if you need to. Losing just 5 to 10 pounds can help. When should you call for help? Call your doctor now or seek immediate medical care if:  ? · You have new or different belly pain. ? · Your stools are black and tarlike or have streaks of blood. ? Watch closely for changes in your health, and be sure to contact your doctor if:  ? · Your symptoms have not improved after 2 days. ? · Food seems to catch in your throat or chest.   Where can you learn more? Go to http://nicol-gurdeep.info/. Enter K632 in the search box to learn more about \"Gastroesophageal Reflux Disease (GERD): Care Instructions. \"  Current as of: May 12, 2017  Content Version: 11.4  © 1169-9006 [employer]. Care instructions adapted under license by [employer] (which disclaims liability or warranty for this information). If you have questions about a medical condition or this instruction, always ask your healthcare professional. [place] [address] any warranty or liability for your use of this information.

## 2018-06-05 NOTE — PROGRESS NOTES
1. Have you been to the ER, urgent care clinic since your last visit? Hospitalized since your last visit? Yes When: 6-4-18 [facility] for stomach pains    2. Have you seen or consulted any other health care providers outside of the [facility] since your last visit? Include any pap smears or colon screening.  No

## 2018-06-05 NOTE — MR AVS SNAPSHOT
Ronda Callahan [place] [address] [facility] [address] [facility] [address] [id number] 
[phone] Patient: Jose Luis Hurst MRN: [id number] KTQ:[id number]/1/3512 Visit Information Date & Time Provider Department Dept. Phone Encounter #  
 6/5/2018  1:15 PM Debbie Pleitez 13 [id number] Follow-up Instructions Return in about 1 month (around 7/5/2018). Upcoming Health Maintenance Date Due DTaP/Tdap/Td series (1 - Tdap) 6/7/2010 PAP AKA CERVICAL CYTOLOGY 6/7/2010 Influenza Age 5 to Adult 8/1/2018 Allergies as of 6/5/2018  Review Complete On: 6/5/2018 By: Alva Severs, LPN No Known Allergies Current Immunizations  Never Reviewed No immunizations on file. Not reviewed this visit You Were Diagnosed With   
  
 Codes Comments Elevated blood sugar level    -  Primary ICD-10-CM: R73.9 ICD-9-CM: 790.29 Essential hypertension     ICD-10-CM: I10 
ICD-9-CM: 401.9 Gastroesophageal reflux disease, esophagitis presence not specified     ICD-10-CM: K21.9 ICD-9-CM: 530.81 Vitals BP Pulse Temp Resp Height(growth percentile) Weight(growth percentile) (!) 138/101 83 97.3 °F (36.3 °C) (Oral) 18 5' 3\" (1.6 m) 218 lb (98.9 kg) LMP BMI OB Status Smoking Status 05/20/2018 (Exact Date) 38.62 kg/m2 Having regular periods Never Smoker Vitals History BMI and BSA Data Body Mass Index Body Surface Area  
 38.62 kg/m 2 2.1 m 2 Preferred Pharmacy Pharmacy Name Phone [facility] [address] [facility], [address] [place] [address] [phone] Your Updated Medication List  
  
   
This list is accurate as of 6/5/18  2:03 PM.  Always use your most recent med list. amLODIPine 10 mg tablet Commonly known as:  Erenest Natalia Take 1 Tab by mouth daily. butalbital-acetaminophen-caff -40 mg per capsule Commonly known as:  [employer] Take 1 Cap by mouth every four (4) hours as needed for Pain. Max Daily Amount: 6 Caps.  
  
 hydroCHLOROthiazide 25 mg tablet Commonly known as:  HYDRODIURIL Take 1 Tab by mouth daily. omeprazole 20 mg capsule Commonly known as:  PRILOSEC Take 1 Cap by mouth daily for 14 days. ondansetron 4 mg disintegrating tablet Commonly known as:  ZOFRAN ODT Take 1 Tab by mouth every eight (8) hours as needed for Nausea. Prescriptions Sent to Pharmacy Refills  
 amLODIPine (NORVASC) 10 mg tablet 1 Sig: Take 1 Tab by mouth daily. Class: Normal  
 Pharmacy: [facility] [address] [place] [address] Ph #: [phone] Route: Oral  
 hydroCHLOROthiazide (HYDRODIURIL) 25 mg tablet 1 Sig: Take 1 Tab by mouth daily. Class: Normal  
 Pharmacy: [facility] [address] [place] [address] Ph #: [phone] Route: Oral  
  
We Performed the Following AMB POC HEMOGLOBIN A1C [12404 CPT(R)] Follow-up Instructions Return in about 1 month (around 7/5/2018). To-Do List   
 06/05/2018 Lab:  MICROALBUMIN, UR, RAND W/ MICROALB/CREAT RATIO Patient Instructions DASH Diet: Care Instructions Your Care Instructions The DASH diet is an eating plan that can help lower your blood pressure. DASH stands for Dietary Approaches to Stop Hypertension. Hypertension is high blood pressure. The DASH diet focuses on eating foods that are high in calcium, potassium, and magnesium. These nutrients can lower blood pressure. The foods that are highest in these nutrients are fruits, vegetables, low-fat dairy products, nuts, seeds, and legumes. But taking calcium, potassium, and magnesium supplements instead of eating foods that are high in those nutrients does not have the same effect. The DASH diet also includes whole grains, fish, and poultry. The DASH diet is one of several lifestyle changes your doctor may recommend to lower your high blood pressure. Your doctor may also want you to decrease the amount of sodium in your diet. Lowering sodium while following the DASH diet can lower blood pressure even further than just the DASH diet alone. Follow-up care is a key part of your treatment and safety. Be sure to make and go to all appointments, and call your doctor if you are having problems. It's also a good idea to know your test results and keep a list of the medicines you take. How can you care for yourself at home? Following the DASH diet · Eat 4 to 5 servings of fruit each day. A serving is 1 medium-sized piece of fruit, ½ cup chopped or canned fruit, 1/4 cup dried fruit, or 4 ounces (½ cup) of fruit juice. Choose fruit more often than fruit juice. · Eat 4 to 5 servings of vegetables each day. A serving is 1 cup of lettuce or raw leafy vegetables, ½ cup of chopped or cooked vegetables, or 4 ounces (½ cup) of vegetable juice. Choose vegetables more often than vegetable juice. · Get 2 to 3 servings of low-fat and fat-free dairy each day. A serving is 8 ounces of milk, 1 cup of yogurt, or 1 ½ ounces of cheese. · Eat 6 to 8 servings of grains each day. A serving is 1 slice of bread, 1 ounce of dry cereal, or ½ cup of cooked rice, pasta, or cooked cereal. Try to choose whole-grain products as much as possible. · Limit lean meat, poultry, and fish to 2 servings each day. A serving is 3 ounces, about the size of a deck of cards. · Eat 4 to 5 servings of nuts, seeds, and legumes (cooked dried beans, lentils, and split peas) each week. A serving is 1/3 cup of nuts, 2 tablespoons of seeds, or ½ cup of cooked beans or peas. · Limit fats and oils to 2 to 3 servings each day. A serving is 1 teaspoon of vegetable oil or 2 tablespoons of salad dressing. · Limit sweets and added sugars to 5 servings or less a week.  A serving is 1 tablespoon jelly or jam, ½ cup sorbet, or 1 cup of lemonade. · Eat less than 2,300 milligrams (mg) of sodium a day. If you limit your sodium to 1,500 mg a day, you can lower your blood pressure even more. Tips for success · Start small. Do not try to make dramatic changes to your diet all at once. You might feel that you are missing out on your favorite foods and then be more likely to not follow the plan. Make small changes, and stick with them. Once those changes become habit, add a few more changes. · Try some of the following: ¨ Make it a goal to eat a fruit or vegetable at every meal and at snacks. This will make it easy to get the recommended amount of fruits and vegetables each day. ¨ Try yogurt topped with fruit and nuts for a snack or healthy dessert. ¨ Add lettuce, tomato, cucumber, and onion to sandwiches. ¨ Combine a ready-made pizza crust with low-fat mozzarella cheese and lots of vegetable toppings. Try using tomatoes, squash, spinach, broccoli, carrots, cauliflower, and onions. ¨ Have a variety of cut-up vegetables with a low-fat dip as an appetizer instead of chips and dip. ¨ Sprinkle sunflower seeds or chopped almonds over salads. Or try adding chopped walnuts or almonds to cooked vegetables. ¨ Try some vegetarian meals using beans and peas. Add garbanzo or kidney beans to salads. Make burritos and tacos with mashed atkins beans or black beans. Where can you learn more? Go to http://nicol-gurdeep.info/. Enter L745 in the search box to learn more about \"DASH Diet: Care Instructions. \" Current as of: September 21, 2016 Content Version: 11.4 © 2202-5964 Scripped. Care instructions adapted under license by [employer] (which disclaims liability or warranty for this information).  If you have questions about a medical condition or this instruction, always ask your healthcare professional. [employer] disclaims any warranty or liability for your use of this information. Low Sodium Diet (2,000 Milligram): Care Instructions Your Care Instructions Too much sodium causes your body to hold on to extra water. This can raise your blood pressure and force your heart and kidneys to work harder. In very serious cases, this could cause you to be put in the hospital. It might even be life-threatening. By limiting sodium, you will feel better and lower your risk of serious problems. The most common source of sodium is salt. People get most of the salt in their diet from canned, prepared, and packaged foods. Fast food and restaurant meals also are very high in sodium. Your doctor will probably limit your sodium to less than 2,000 milligrams (mg) a day. This limit counts all the sodium in prepared and packaged foods and any salt you add to your food. Follow-up care is a key part of your treatment and safety. Be sure to make and go to all appointments, and call your doctor if you are having problems. It's also a good idea to know your test results and keep a list of the medicines you take. How can you care for yourself at home? Read food labels · Read labels on cans and food packages. The labels tell you how much sodium is in each serving. Make sure that you look at the serving size. If you eat more than the serving size, you have eaten more sodium. · Food labels also tell you the Percent Daily Value for sodium. Choose products with low Percent Daily Values for sodium. · Be aware that sodium can come in forms other than salt, including monosodium glutamate (MSG), sodium citrate, and sodium bicarbonate (baking soda). MSG is often added to Asian food. When you eat out, you can sometimes ask for food without MSG or added salt. Buy low-sodium foods · Buy foods that are labeled \"unsalted\" (no salt added), \"sodium-free\" (less than 5 mg of sodium per serving), or \"low-sodium\" (less than 140 mg of sodium per serving). Foods labeled \"reduced-sodium\" and \"light sodium\" may still have too much sodium. Be sure to read the label to see how much sodium you are getting. · Buy fresh vegetables, or frozen vegetables without added sauces. Buy low-sodium versions of canned vegetables, soups, and other canned goods. Prepare low-sodium meals · Cut back on the amount of salt you use in cooking. This will help you adjust to the taste. Do not add salt after cooking. One teaspoon of salt has about 2,300 mg of sodium. · Take the salt shaker off the table. · Flavor your food with garlic, lemon juice, onion, vinegar, herbs, and spices. Do not use soy sauce, lite soy sauce, steak sauce, onion salt, garlic salt, celery salt, mustard, or ketchup on your food. · Use low-sodium salad dressings, sauces, and ketchup. Or make your own salad dressings and sauces without adding salt. · Use less salt (or none) when recipes call for it. You can often use half the salt a recipe calls for without losing flavor. Other foods such as rice, pasta, and grains do not need added salt. · Rinse canned vegetables, and cook them in fresh water. This removes some-but not all-of the salt. · Avoid water that is naturally high in sodium or that has been treated with water softeners, which add sodium. Call your local water company to find out the sodium content of your water supply. If you buy bottled water, read the label and choose a sodium-free brand. Avoid high-sodium foods · Avoid eating: ¨ Smoked, cured, salted, and canned meat, fish, and poultry. ¨ Ham, wasserman, hot dogs, and luncheon meats. ¨ Regular, hard, and processed cheese and regular peanut butter. ¨ Crackers with salted tops, and other salted snack foods such as pretzels, chips, and salted popcorn. ¨ Frozen prepared meals, unless labeled low-sodium. ¨ Canned and dried soups, broths, and bouillon, unless labeled sodium-free or low-sodium. ¨ Canned vegetables, unless labeled sodium-free or low-sodium. ¨ [place] fries, pizza, tacos, and other fast foods. ¨ Pickles, olives, ketchup, and other condiments, especially soy sauce, unless labeled sodium-free or low-sodium. Where can you learn more? Go to http://nicol-gurdeep.info/. Enter J711 in the search box to learn more about \"Low Sodium Diet (2,000 Milligram): Care Instructions. \" Current as of: May 12, 2017 Content Version: 11.4 © 0747-4968 PastBook. Care instructions adapted under license by [employer] (which disclaims liability or warranty for this information). If you have questions about a medical condition or this instruction, always ask your healthcare professional. [address] any warranty or liability for your use of this information. How to Read a Food Label to Limit Sodium: Care Instructions Your Care Instructions Sodium causes your body to hold on to extra water. This can raise your blood pressure and force your heart and kidneys to work harder. In very serious cases, this could cause you to be put in the hospital. It might even be life-threatening. By limiting sodium, you will feel better and lower your risk of serious problems. Processed foods, fast food, and restaurant foods are the major sources of dietary sodium. The most common name for sodium is salt. Try to limit how much sodium you eat to less than 2,300 milligrams (mg) a day. If you limit your sodium to 1,500 mg a day, you can lower your blood pressure even more. This limit counts all the salt that you eat in foods you cook or in packaged foods. Keep a list of everything you eat and drink. Follow-up care is a key part of your treatment and safety. Be sure to make and go to all appointments, and call your doctor if you are having problems. It's also a good idea to know your test results and keep a list of the medicines you take. How can you care for yourself at home? Read ingredient lists on food labels · Read the list of ingredients on food labels to help you find how much sodium is in a food. The label lists the ingredients in a food in descending order (from the most to the least). If salt or sodium is high on the list, there may be a lot of sodium in the food. · Know that sodium has different names. Sodium is also called monosodium glutamate (MSG, common in [facility] food), sodium citrate, sodium alginate, sodium hydroxide, and sodium phosphate. Read Nutrition Facts labels · On most foods, there is a Nutrition Facts label. This will tell you how much sodium is in one serving of food. Look at both the serving size and the sodium amount. The serving size is located at the top of the label, usually right under the \"Nutrition Facts\" title. The amount of sodium is given in the list under the title. It is given in milligrams (mg). ¨ Check the serving size carefully. A single serving is often very small, and you may eat more than one serving. If this is the case, you will eat more sodium than listed on the label. For example, if the serving size for a canned soup is 1 cup and the sodium amount is 470 mg, if you have 2 cups you will eat 940 mg of sodium. · The nutrition facts for fresh fruits and vegetables are not listed on the food. They may be listed somewhere in the store. These foods usually have no sodium or low sodium. · The Nutrition Facts label also gives you the Percent Daily Value for sodium. This is how much of the recommended amount of sodium a serving contains. The daily value for sodium is less than 2,300 mg. So if the Percent Daily Value says 50%, this means one serving is giving you half of this, or 1,150 mg. Buy low-sodium foods · Look for foods that are made with less sodium. Watch for the following words on the label. ¨ \"Unsalted\" means there is no sodium added to the food.  But there may be sodium already in the food naturally. ¨ \"Sodium-free\" means a serving has less than 5 mg of sodium. ¨ \"Very low sodium\" means a serving has 35 mg or less of sodium. ¨ \"Low-sodium\" means a serving has 140 mg or less of sodium. · \"Reduced-sodium\" means that there is 25% less sodium than what the food normally has. This is still usually too much sodium. Try not to buy foods with this on the label. · Buy fresh vegetables, or frozen vegetables without added sauces. Buy low-sodium versions of canned vegetables, soups, and other canned goods. Where can you learn more? Go to http://nicolDriverSaveClub.comgurdeep.info/. Enter 26 149269 in the search box to learn more about \"How to Read a Food Label to Limit Sodium: Care Instructions. \" Current as of: May 12, 2017 Content Version: 11.4 © 0019-5569 EPAM Systems. Care instructions adapted under license by [employer] (which disclaims liability or warranty for this information). If you have questions about a medical condition or this instruction, always ask your healthcare professional. [place] [address] any warranty or liability for your use of this information. Gastroesophageal Reflux Disease (GERD): Care Instructions Your Care Instructions Gastroesophageal reflux disease (GERD) is the backward flow of stomach acid into the esophagus. The esophagus is the tube that leads from your throat to your stomach. A one-way valve prevents the stomach acid from moving up into this tube. When you have GERD, this valve does not close tightly enough. If you have mild GERD symptoms including heartburn, you may be able to control the problem with antacids or over-the-counter medicine. Changing your diet, losing weight, and making other lifestyle changes can also help reduce symptoms. Follow-up care is a key part of your treatment and safety.  Be sure to make and go to all appointments, and call your doctor if you are having problems. It's also a good idea to know your test results and keep a list of the medicines you take. How can you care for yourself at home? · Take your medicines exactly as prescribed. Call your doctor if you think you are having a problem with your medicine. · Your doctor may recommend over-the-counter medicine. For mild or occasional indigestion, antacids, such as Tums, Gaviscon, Mylanta, or Maalox, may help. Your doctor also may recommend over-the-counter acid reducers, such as Pepcid AC, Tagamet HB, Zantac 75, or Prilosec. Read and follow all instructions on the label. If you use these medicines often, talk with your doctor. · Change your eating habits. ¨ It's best to eat several small meals instead of two or three large meals. ¨ After you eat, wait 2 to 3 hours before you lie down. ¨ Chocolate, mint, and alcohol can make GERD worse. ¨ Spicy foods, foods that have a lot of acid (like tomatoes and oranges), and coffee can make GERD symptoms worse in some people. If your symptoms are worse after you eat a certain food, you may want to stop eating that food to see if your symptoms get better. · Do not smoke or chew tobacco. Smoking can make GERD worse. If you need help quitting, talk to your doctor about stop-smoking programs and medicines. These can increase your chances of quitting for good. · If you have GERD symptoms at night, raise the head of your bed 6 to 8 inches by putting the frame on blocks or placing a foam wedge under the head of your mattress. (Adding extra pillows does not work.) · Do not wear tight clothing around your middle. · Lose weight if you need to. Losing just 5 to 10 pounds can help. When should you call for help? Call your doctor now or seek immediate medical care if: 
? · You have new or different belly pain. ? · Your stools are black and tarlike or have streaks of blood. ? Watch closely for changes in your health, and be sure to contact your doctor if: ? · Your symptoms have not improved after 2 days. ? · Food seems to catch in your throat or chest.  
Where can you learn more? Go to http://nicol-gurdeep.info/. Enter I384 in the search box to learn more about \"Gastroesophageal Reflux Disease (GERD): Care Instructions. \" Current as of: May 12, 2017 Content Version: 11.4 © 0975-7570 amaysim. Care instructions adapted under license by [employer] (which disclaims liability or warranty for this information). If you have questions about a medical condition or this instruction, always ask your healthcare professional. [place] [address] any warranty or liability for your use of this information. Introducing \[facility] [place] [facility]\"" & HEALTH SERVICES! [employer] introduces "[employer]" patient portal. Now you can access parts of your medical record, email your doctor's office, and request medication refills online. 1. In your internet browser, go to [email or web address] 2. Click on the First Time User? Click Here link in the Sign In box. You will see the New Member Sign Up page. 3. Enter your "[employer]" Access Code exactly as it appears below. You will not need to use this code after youve completed the sign-up process. If you do not sign up before the expiration date, you must request a new code. · "[employer]" Access Code: [email or web address] Expires: 8/31/2018 11:32 PM 
 
4. Enter the last four digits of your Social Security Number (xxxx) and Date of Birth (mm/dd/yyyy) as indicated and click Submit. You will be taken to the next sign-up page. 5. Create a "[employer]" ID. This will be your "[employer]" login ID and cannot be changed, so think of one that is secure and easy to remember. 6. Create a "[employer]" password. You can change your password at any time. 7. Enter your Password Reset Question and Answer. This can be used at a later time if you forget your password. 8. Enter your e-mail address. You will receive e-mail notification when new information is available in [address] 9. Click Sign Up. You can now view and download portions of your medical record. 10. Click the Download Summary menu link to download a portable copy of your medical information. If you have questions, please visit the Frequently Asked Questions section of the Packet[employer] website. Remember, Packet[employer] is NOT to be used for urgent needs. For medical emergencies, dial 911. Now available from your iPhone and Android! Please provide this summary of care documentation to your next provider. Your primary care clinician is listed as Cristina Pate. If you have any questions after today's visit, please call [phone].

## 2018-06-06 ENCOUNTER — DOCUMENTATION ONLY (OUTPATIENT)
Dept: FAMILY MEDICINE CLINIC | Age: 28
End: 2018-06-06

## 2018-06-06 PROBLEM — R80.9 MICROALBUMINURIA: Status: ACTIVE | Noted: 2018-06-06

## 2018-06-06 PROBLEM — I10 HTN (HYPERTENSION): Status: ACTIVE | Noted: 2018-06-06

## 2018-06-06 PROBLEM — K21.9 GERD (GASTROESOPHAGEAL REFLUX DISEASE): Status: ACTIVE | Noted: 2018-06-06

## 2018-06-06 LAB
CREATININE, URINE: 146 MG/DL
MICROALB/CREAT RATIO, 140286: 52.3 MCG/MG OF CREATININE (ref 0–30)
MICROALBUMIN,URINE RANDOM 140054: 76.4 UG/ML (ref 0.1–17)

## 2018-06-06 NOTE — PROGRESS NOTES
Microalbuminuria is likely 2/2 inadequately controlled HTN. Plan for repeat urine microalbumin/Cr in 3 months.

## 2018-07-03 ENCOUNTER — OFFICE VISIT (OUTPATIENT)
Dept: FAMILY MEDICINE CLINIC | Age: 28
End: 2018-07-03

## 2018-07-03 VITALS
HEIGHT: 63 IN | HEART RATE: 82 BPM | RESPIRATION RATE: 16 BRPM | OXYGEN SATURATION: 98 % | DIASTOLIC BLOOD PRESSURE: 86 MMHG | BODY MASS INDEX: 38.09 KG/M2 | WEIGHT: 215 LBS | TEMPERATURE: 97.7 F | SYSTOLIC BLOOD PRESSURE: 128 MMHG

## 2018-07-03 DIAGNOSIS — R80.9 MICROALBUMINURIA: ICD-10-CM

## 2018-07-03 DIAGNOSIS — I10 ESSENTIAL HYPERTENSION: Primary | ICD-10-CM

## 2018-07-03 PROBLEM — E66.01 SEVERE OBESITY (BMI 35.0-39.9): Status: ACTIVE | Noted: 2018-07-03

## 2018-07-03 NOTE — PROGRESS NOTES
[facility]    CC: F/U of HTN    HPI:     HTN:  - Got requested lab work  - Taking medication as prescribed  - Denies any side effects or issues with her medications  - Does not check BP at home  - Trying to following dietary recommendations  - Not following any exercise regimen      ROS: Positive items marked in RED  CON: fever, chills  Cardiovascular: palpitations, CP  Resp: cough, SOB  GI: nausea, vomiting, diarrhea  : dysuria, hematuria      Past Medical History:   Diagnosis Date    GERD (gastroesophageal reflux disease)     Hypertension        Past Surgical History:   Procedure Laterality Date    HX HEENT      left eye surgery for lazy eye       Family History   Problem Relation Age of Onset    Diabetes Father        Social History     Social History    Marital status: UNKNOWN     Spouse name: N/A    Number of children: N/A    Years of education: N/A     Occupational History    CNA      Social History Main Topics    Smoking status: Never Smoker    Smokeless tobacco: Never Used    Alcohol use No    Drug use: No    Sexual activity: Yes     Partners: Male     Birth control/ protection: Condom     Other Topics Concern    None     Social History Narrative       No Known Allergies      Current Outpatient Prescriptions:     amLODIPine (NORVASC) 10 mg tablet, Take 1 Tab by mouth daily. , Disp: 90 Tab, Rfl: 1    hydroCHLOROthiazide (HYDRODIURIL) 25 mg tablet, Take 1 Tab by mouth daily. , Disp: 30 Tab, Rfl: 1    Physical Exam:      /86 (BP 1 Location: Right arm, BP Patient Position: Sitting)  Pulse 82  Temp 97.7 °F (36.5 °C) (Oral)   Resp 16  Ht 5' 3\" (1.6 m)  Wt 215 lb (97.5 kg)  LMP 06/20/2018 (Exact Date)  SpO2 98%  BMI 38.09 kg/m2    General: obese habitus, NAD  Eyes: sclera clear bilaterally, no discharge noted, eyelids normal in appearance  HENT: NCAT  Lungs: CTAB, Normal respiratory effort and rate  CV: RRR, no MRGs  ABD: soft, non-tender, non-distended, normal bowel sounds  Ext: no peripheral edema or digital cyanosis noted  Skin: normal temperature, turgor, color, and texture  Psych: alert and oriented to person, place and time, normal affect  Neuro: Speech normal, moving all extremities, gait normal      MICROALBUMIN, UR, RAND W/ MICROALB/CREAT RATIO (6/5/2018):      Component Value Flag Ref Range Units Status     Creatinine, urine 146   mg/dL Final     Microalbumin, urine 76.4 (H) 0.1 - 17.0 ug/mL Final     Microalb/Creat ratio (ug/mg creat.) 52.3 (H) 0.0 - 30.0 mcg/mg of Creatinine Final       Assessment/Plan     HTN, Improving:  - Will continue current BP regimen  - Will plan to recheck urine Microalbumin/Cr at follow up visit  - If patient continues to have microalbuminuria, will plan to adjust BP regimen to include lisinopril  - Follow up in 2 months for well woman visit        Lydia Mak MD  7/3/2018, 9:26 AM

## 2018-07-03 NOTE — PROGRESS NOTES
Chief Complaint   Patient presents with    Follow Up Chronic Condition     1 month f/u.  Hypertension    GERD     1. Have you been to the ER, urgent care clinic since your last visit? Hospitalized since your last visit? No    2. Have you seen or consulted any other health care providers outside of the [address] [place] [address] since your last visit? Include any pap smears or colon screening.  No    PHQ over the last two weeks 7/3/2018   Little interest or pleasure in doing things Not at all   Feeling down, depressed or hopeless Not at all   Total Score PHQ 2 0     Visit Vitals    /86 (BP 1 Location: Right arm, BP Patient Position: Sitting)    Pulse 82    Temp 97.7 °F (36.5 °C) (Oral)    Resp 16    Ht 5' 3\" (1.6 m)    Wt 215 lb (97.5 kg)    SpO2 98%    BMI 38.09 kg/m2

## 2018-07-03 NOTE — MR AVS SNAPSHOT
Ronda Callahan [place] [address] [facility] [address] [facility] [address] [id number] 
[phone] Patient: Elle Dubose MRN: [id number] CTE:[id number] Visit Information Date & Time Provider Department Dept. Phone Encounter #  
 7/3/2018  9:00 AM Baron Rose, [address] [place] [address] [id number] Follow-up Instructions Return in about 2 months (around 9/3/2018) for Well woman visit. Upcoming Health Maintenance Date Due DTaP/Tdap/Td series (1 - Tdap) 6/7/2010 PAP AKA CERVICAL CYTOLOGY 6/7/2010 Influenza Age 5 to Adult 8/1/2018 Allergies as of 7/3/2018  Review Complete On: 7/3/2018 By: Venessa Nunez LPN No Known Allergies Current Immunizations  Never Reviewed No immunizations on file. Not reviewed this visit You Were Diagnosed With   
  
 Codes Comments Essential hypertension    -  Primary ICD-10-CM: I10 
ICD-9-CM: 401.9 Vitals BP Pulse Temp Resp Height(growth percentile) Weight(growth percentile) 128/86 (BP 1 Location: Right arm, BP Patient Position: Sitting) 82 97.7 °F (36.5 °C) (Oral) 16 5' 3\" (1.6 m) 215 lb (97.5 kg) LMP SpO2 BMI OB Status Smoking Status 06/20/2018 (Exact Date) 98% 38.09 kg/m2 Having regular periods Never Smoker BMI and BSA Data Body Mass Index Body Surface Area 38.09 kg/m 2 2.08 m 2 Preferred Pharmacy Pharmacy Name Phone [facility] [address] [phone] Your Updated Medication List  
  
   
This list is accurate as of 7/3/18  9:31 AM.  Always use your most recent med list. amLODIPine 10 mg tablet Commonly known as:  Velarde Cater Take 1 Tab by mouth daily. hydroCHLOROthiazide 25 mg tablet Commonly known as:  HYDRODIURIL Take 1 Tab by mouth daily. Follow-up Instructions Return in about 2 months (around 9/3/2018) for Well woman visit. Introducing [facility]! Dear Lanette Presley: Thank you for requesting a Bluepay account. Our records indicate that you already have an active Bluepay account. You can access your account anytime at [email or web address] Did you know that you can access your hospital and ER discharge instructions at any time in Bluepay? You can also review all of your test results from your hospital stay or ER visit. Additional Information If you have questions, please visit the Frequently Asked Questions section of the Bluepay website at [email or web address] Remember, Bluepay is NOT to be used for urgent needs. For medical emergencies, dial 911. Now available from your iPhone and Android! Please provide this summary of care documentation to your next provider. Your primary care clinician is listed as Angelina Murillo. If you have any questions after today's visit, please call [phone].

## 2018-07-03 NOTE — PROGRESS NOTES
1. Have you been to the ER, urgent care clinic since your last visit? Hospitalized since your last visit? 2. Have you seen or consulted any other health care providers outside of the [address] [place] [address] since your last visit? Include any pap smears or colon screening.      Chief Complaint   Patient presents with    Follow Up Chronic Condition    Hypertension    GERD

## 2018-10-23 ENCOUNTER — OFFICE VISIT (OUTPATIENT)
Dept: FAMILY MEDICINE CLINIC | Age: 28
End: 2018-10-23

## 2018-10-23 VITALS
HEART RATE: 83 BPM | BODY MASS INDEX: 39.12 KG/M2 | WEIGHT: 220.8 LBS | OXYGEN SATURATION: 98 % | DIASTOLIC BLOOD PRESSURE: 84 MMHG | RESPIRATION RATE: 18 BRPM | TEMPERATURE: 98.7 F | HEIGHT: 63 IN | SYSTOLIC BLOOD PRESSURE: 136 MMHG

## 2018-10-23 DIAGNOSIS — Z00.00 WELL WOMAN EXAM (NO GYNECOLOGICAL EXAM): ICD-10-CM

## 2018-10-23 DIAGNOSIS — K21.9 GASTROESOPHAGEAL REFLUX DISEASE, ESOPHAGITIS PRESENCE NOT SPECIFIED: ICD-10-CM

## 2018-10-23 DIAGNOSIS — I10 ESSENTIAL HYPERTENSION: ICD-10-CM

## 2018-10-23 DIAGNOSIS — E66.09 CLASS 2 OBESITY DUE TO EXCESS CALORIES WITHOUT SERIOUS COMORBIDITY WITH BODY MASS INDEX (BMI) OF 39.0 TO 39.9 IN ADULT: ICD-10-CM

## 2018-10-23 DIAGNOSIS — Z23 ENCOUNTER FOR IMMUNIZATION: ICD-10-CM

## 2018-10-23 DIAGNOSIS — Z00.00 WELL WOMAN EXAM (NO GYNECOLOGICAL EXAM): Primary | ICD-10-CM

## 2018-10-23 RX ORDER — RANITIDINE 150 MG/1
150 TABLET, FILM COATED ORAL 2 TIMES DAILY
Qty: 180 TAB | Refills: 6 | Status: SHIPPED | OUTPATIENT
Start: 2018-10-23 | End: 2019-12-04 | Stop reason: SDUPTHER

## 2018-10-23 NOTE — PROGRESS NOTES
[facility]    CC: Well Woman Exam    HPI:     Well Woman Visit:  - 33 y.o. Single  - Her gyn and breast care is done elsewhere by her Ob-Gyn physician ([facility]). - Menstrual Hx: Menarche: 11yo. Cycle length: ~28 days, Period duration: 4 days, Flow: normal, LMP: End of September  - Gyn Hx: Had PAP smear this year. PAP was normal.   - Sexual Hx: inactive for the past four months, Protection: Condoms, Orientation: heterosexual, Number of partners past year: 1  - STD Hx: yes, chlamydia and trichomonas  - Sun exposure: no. Using any protection: no  - Prenatal vitamins or calcium supplement: no  - Other srini providers seen: [facility]. Does not see a dentist  - Physical activity: no  - Diet: low salt. Dairy:  no   - Caffeine: One soda a week  - Safety: Seat belts: no Fire alarms: yes  - Reports she feels safe at home and in her neighborhood. Reports she feels her kids are safe at home and in her neighborhood.    - Immunizations: Reports she had a TDAP in       ROS: Positive items marked in RED  CON: fever, chills  Cardiovascular: CP, palpitations, swelling of lower extremities  Resp: cough, SOB  GI: nausea, vomiting, diarrhea, reflux/heartburn  Breast: lumps/bumps, pain, nipple changes/discharge  : dysuria, hematuria, abnormal vaginal discharge/bleeding  Psych: depression, anxiety    Past Medical History:   Diagnosis Date    GERD (gastroesophageal reflux disease)     Hypertension        GYN History           OB History      Para Term  AB Living    2 2 2     2    SAB TAB Ectopic Molar Multiple Live Births                         Past Surgical History:   Procedure Laterality Date    HX HEENT      left eye surgery for lazy eye       Family History   Problem Relation Age of Onset    Diabetes Father        Social History     Socioeconomic History    Marital status: UNKNOWN     Spouse name: Not on file    Number of children: Not on file    Years of education: Not on file    Highest education level: Not on file   Social Needs    Financial resource strain: Not on file    Food insecurity - worry: Not on file    Food insecurity - inability: Not on file    Transportation needs - medical: Not on file   [employer] needs - non-medical: Not on file   Occupational History    Occupation: CNA   Tobacco Use    Smoking status: Never Smoker    Smokeless tobacco: Never Used   Substance and Sexual Activity    Alcohol use: No    Drug use: No    Sexual activity: Yes     Partners: Male     Birth control/protection: Condom   Other Topics Concern    Not on file   Social History Narrative    Not on file       No Known Allergies      Current Outpatient Medications:     hydroCHLOROthiazide (HYDRODIURIL) 25 mg tablet, TAKE 1 TABLET BY MOUTH DAILY, Disp: 90 Tab, Rfl: 0    amLODIPine (NORVASC) 10 mg tablet, Take 1 Tab by mouth daily. , Disp: 90 Tab, Rfl: 1    Physical Exam:      /84 (BP 1 Location: Left arm, BP Patient Position: Sitting)   Pulse 83   Temp 98.7 °F (37.1 °C) (Oral)   Resp 18   Ht 5' 3\" (1.6 m)   SpO2 98%   BMI 38.09 kg/m²     General: obese habitus, NAD, conversant  Eyes: sclera clear bilaterally, no discharge noted, eyelids normal in appearance  HENT: NCAT, oropharynx clear, MMM, EOMI, PERRLA  Lungs: CTAB, Normal respiratory effort and rate  CV: RRR, no MRGs  ABD: soft, non-tender, non-distended, normal bowel sounds  Ext: no peripheral edema or digital cyanosis noted  Skin: normal temperature, turgor, color, and texture  Psych: alert and oriented to person, place and time, normal affect  Neuro: speech normal, moving all extremities, gait normal, sensation and CN II-XII grossly intact    *Breast and Pelvic exams were deferred*    [facility] Results for Naveed Rogers (MRN [id number]):   Ref. Range 05/24/18 12:00   HPV High Risk Negative Negative       LIQUID BASED PAP TESTING SCREENING (5/24/2018):   Component Value Flag Ref Range Units Status   Final Diagnosis Negative for Intraepithelial Lesion or Malignancy (NIL)     Final   Specimen Adequacy Satisfactory for evaluation, endocervical/transformation zone component present.     Final         Assessment/Plan     Well Woman Visit:  - Advised to see Dentist at least twice a year for her oral health and to wear seatbelt in vehicle  - Recommended she increase physical activity to 150 mins a week  - Discussed importance of using protection with sun exposure  - Will check urine microalbumin/Cr and fasting lipid panel  - Flu shot today  - GERD: Will start on ranitidine regimen  - Obesity: counseled on lifestyle changes  - Handout given on weight loss planning, low carb diet, and calorie restriction  - F/U one month for chronic disease management        Prabhakar Alatorre MD  10/23/2018, 10:18 AM

## 2018-10-23 NOTE — PROGRESS NOTES
1. Have you been to the ER, urgent care clinic since your last visit? Hospitalized since your last visit? NO    2. Have you seen or consulted any other health care providers outside of the [address] [place] [address] since your last visit? Include any pap smears or colon screening. No    Visit Vitals  /84 (BP 1 Location: Left arm, BP Patient Position: Sitting)   Pulse 83   Temp 98.7 °F (37.1 °C) (Oral)   Resp 18   Ht 5' 3\" (1.6 m)   SpO2 98%   BMI 38.09 kg/m²     Chief Complaint   Patient presents with    Follow-up     Kidneys    Chest Pain     Arm tingle sensation    Immunization/Injection     Influenza     Patient presents for influenza injection. Area prepped and injection given. No signs nor symptoms of adverse reaction noted. Patient tolerated injection well.

## 2018-10-23 NOTE — PATIENT INSTRUCTIONS
Starting a Weight Loss Plan: Care Instructions  Your Care Instructions    If you are thinking about losing weight, it can be hard to know where to start. Your doctor can help you set up a weight loss plan that best meets your needs. You may want to take a class on nutrition or exercise, or join a weight loss support group. If you have questions about how to make changes to your eating or exercise habits, ask your doctor about seeing a registered dietitian or an exercise specialist.  It can be a big challenge to lose weight. But you do not have to make huge changes at once. Make small changes, and stick with them. When those changes become habit, add a few more changes. If you do not think you are ready to make changes right now, try to pick a date in the future. Make an appointment to see your doctor to discuss whether the time is right for you to start a plan. Follow-up care is a key part of your treatment and safety. Be sure to make and go to all appointments, and call your doctor if you are having problems. It's also a good idea to know your test results and keep a list of the medicines you take. How can you care for yourself at home? · Set realistic goals. Many people expect to lose much more weight than is likely. A weight loss of 5% to 10% of your body weight may be enough to improve your health. · Get family and friends involved to provide support. Talk to them about why you are trying to lose weight, and ask them to help. They can help by participating in exercise and having meals with you, even if they may be eating something different. · Find what works best for you. If you do not have time or do not like to cook, a program that offers meal replacement bars or shakes may be better for you. Or if you like to prepare meals, finding a plan that includes daily menus and recipes may be best.  · Ask your doctor about other health professionals who can help you achieve your weight loss goals. ?  A dietitian can help you make healthy changes in your diet. ? An exercise specialist or  can help you develop a safe and effective exercise program.  ? A counselor or psychiatrist can help you cope with issues such as depression, anxiety, or family problems that can make it hard to focus on weight loss. · Consider joining a support group for people who are trying to lose weight. Your doctor can suggest groups in your area. Where can you learn more? Go to http://nicol-gurdeep.info/. Enter J807 in the search box to learn more about \"Starting a Weight Loss Plan: Care Instructions. \"  Current as of: June 26, 2018  Content Version: 11.8  © 7537-9164 [employer]. Care instructions adapted under license by [employer] (which disclaims liability or warranty for this information). If you have questions about a medical condition or this instruction, always ask your healthcare professional. [address] any warranty or liability for your use of this information. Learning About Cutting Calories  How do calories affect your weight? Food gives your body energy. Energy from the food you eat is measured in calories. This energy keeps your heart beating, your brain active, and your muscles working. Your body needs a certain number of calories each day. After your body uses the calories it needs, it stores extra calories as fat. To lose weight safely, you have to eat fewer calories while eating in a healthy way. How many calories do you need each day? The more active you are, the more calories you need. When you are less active, you need fewer calories. How many calories you need each day also depends on several things, including your age and whether you are male or female. Here are some general guidelines for adults:  · Less active women and older adults need 1,600 to 2,000 calories each day.   · Active women and less active men need 2,000 to 2,400 calories each day. · Active men need 2,400 to 3,000 calories each day. How can you cut calories and eat healthy meals? Whole grains, vegetables and fruits, and dried beans are good lower-calorie foods. They give you lots of nutrients and fiber. And they fill you up. Sweets, energy drinks, and soda pop are high in calories. They give you few nutrients and no fiber. Try to limit soda pop, fruit juice, and energy drinks. Drink water instead. Some fats can be part of a healthy diet. But cutting back on fats from highly processed foods like fast foods and many snack foods is a good way to lower the calories in your diet. Also, use smaller amounts of fats like butter, margarine, salad dressing, and mayonnaise. Add fresh garlic, lemon, or herbs to your meals to add flavor without adding fat. Meats and dairy products can be a big source of hidden fats. Try to choose lean or low-fat versions of these products. Fat-free cookies, candies, chips, and frozen treats can still be high in sugar and calories. Some fat-free foods have more calories than regular ones. Eat fat-free treats in moderation, as you would other foods. If your favorite foods are high in fat, salt, sugar, or calories, limit how often you eat them. Eat smaller servings, or look for healthy substitutes. Fill up on fruits, vegetables, and whole grains. Eating at home  · Use meat as a side dish instead of as the main part of your meal.  · Try main dishes that use whole wheat pasta, brown rice, dried beans, or vegetables. · Find ways to cook with little or no fat, such as broiling, steaming, or grilling. · Use cooking spray instead of oil. If you use oil, use a monounsaturated oil, such as canola or olive oil. · Trim fat from meats before you cook them. · Drain off fat after you brown the meat or while you roast it. · Chill soups and stews after you cook them. Then skim the fat off the top after it hardens.   Eating out  · Order foods that are broiled or poached rather than fried or breaded. · Cut back on the amount of butter or margarine that you use on bread. · Order sauces, gravies, and salad dressings on the side, and use only a little. · When you order pasta, choose tomato sauce rather than cream sauce. · Ask for salsa with your baked potato instead of sour cream, butter, cheese, or wasserman. · Order meals in a small size instead of upgrading to a large. · Share an entree, or take part of your food home to eat as another meal.  · Share appetizers and desserts. Where can you learn more? Go to http://nicolReedsygurdeep.info/. Enter 99 [id number] in the search box to learn more about \"Learning About Cutting Calories. \"  Current as of: March 29, 2018  Content Version: 11.8  © 4608-9203 [employer]. Care instructions adapted under license by [employer] (which disclaims liability or warranty for this information). If you have questions about a medical condition or this instruction, always ask your healthcare professional. [place] [address] any warranty or liability for your use of this information. Learning About Low-Carbohydrate Diets for Weight Loss  What is a low-carbohydrate diet? Low-carb diets avoid foods that are high in carbohydrate. These high-carb foods include pasta, bread, rice, cereal, fruits, and starchy vegetables. Instead, these diets usually have you eat foods that are high in fat and protein. Many people lose weight quickly on a low-carb diet. But the early weight loss is water. People on this diet often gain the weight back after they start eating carbs again. Not all diet plans are safe or work well. A lot of the evidence shows that low-carb diets aren't healthy. That's because these diets often don't include healthy foods like fruits and vegetables. Losing weight safely means balancing protein, fat, and carbs with every meal and snack.  And low-carb diets don't always provide the vitamins, minerals, and fiber you need. If you have a serious medical condition, talk to your doctor before you try any diet. These conditions include kidney disease, heart disease, type 2 diabetes, high cholesterol, and high blood pressure. If you are pregnant, it may not be safe for your baby if you are on a low-carb diet. How can you lose weight safely? You might have heard that a diet plan helped another person lose weight. But that doesn't mean that it will work for you. It is very hard to stay on a diet that includes lots of big changes in your eating habits. If you want to get to a healthy weight and stay there, making healthy lifestyle changes will often work better than dieting. These steps can help. · Make a plan for change. Work with your doctor to create a plan that is right for you. · See a dietitian. He or she can show you how to make healthy changes in your eating habits. · Manage stress. If you have a lot of stress in your life, it can be hard to focus on making healthy changes to your daily habits. · Track your food and activity. You are likely to do better at losing weight if you keep track of what you eat and what you do. Follow-up care is a key part of your treatment and safety. Be sure to make and go to all appointments, and call your doctor if you are having problems. It's also a good idea to know your test results and keep a list of the medicines you take. Where can you learn more? Go to http://nicol-gurdeep.info/. Enter A121 in the search box to learn more about \"Learning About Low-Carbohydrate Diets for Weight Loss. \"  Current as of: March 29, 2018  Content Version: 11.8  © 1394-6613 [employer]. Care instructions adapted under license by [employer] (which disclaims liability or warranty for this information).  If you have questions about a medical condition or this instruction, always ask your healthcare professional. [employer] disclaims any warranty or liability for your use of this information.

## 2018-10-24 LAB
CREATININE, URINE: 103 MG/DL
MICROALB/CREAT RATIO, 140286: 51.6 MCG/MG OF CREATININE (ref 0–30)
MICROALBUMIN,URINE RANDOM 140054: 53.1 MCG/ML (ref 0.1–17)

## 2018-10-25 ENCOUNTER — DOCUMENTATION ONLY (OUTPATIENT)
Dept: FAMILY MEDICINE CLINIC | Age: 28
End: 2018-10-25

## 2019-02-07 ENCOUNTER — OFFICE VISIT (OUTPATIENT)
Dept: FAMILY MEDICINE CLINIC | Age: 29
End: 2019-02-07

## 2019-02-07 VITALS
SYSTOLIC BLOOD PRESSURE: 146 MMHG | TEMPERATURE: 97.6 F | HEIGHT: 63 IN | HEART RATE: 83 BPM | OXYGEN SATURATION: 99 % | DIASTOLIC BLOOD PRESSURE: 99 MMHG | RESPIRATION RATE: 20 BRPM | BODY MASS INDEX: 39.51 KG/M2 | WEIGHT: 223 LBS

## 2019-02-07 DIAGNOSIS — R10.819 SUPRAPUBIC TENDERNESS: ICD-10-CM

## 2019-02-07 DIAGNOSIS — R20.0 NUMBNESS OR TINGLING: ICD-10-CM

## 2019-02-07 DIAGNOSIS — R20.2 NUMBNESS OR TINGLING: ICD-10-CM

## 2019-02-07 DIAGNOSIS — I10 ESSENTIAL HYPERTENSION: Primary | ICD-10-CM

## 2019-02-07 DIAGNOSIS — R10.9 BILATERAL FLANK PAIN: ICD-10-CM

## 2019-02-07 LAB
BILIRUB UR QL STRIP: NEGATIVE
GLUCOSE UR-MCNC: NEGATIVE MG/DL
KETONES P FAST UR STRIP-MCNC: NEGATIVE MG/DL
PH UR STRIP: 7 [PH] (ref 4.6–8)
PROT UR QL STRIP: NEGATIVE
SP GR UR STRIP: 1.01 (ref 1–1.03)
UA UROBILINOGEN AMB POC: NORMAL (ref 0.2–1)
URINALYSIS CLARITY POC: CLEAR
URINALYSIS COLOR POC: COLORLESS
URINE BLOOD POC: NEGATIVE
URINE LEUKOCYTES POC: NEGATIVE
URINE NITRITES POC: NEGATIVE

## 2019-02-07 RX ORDER — LISINOPRIL AND HYDROCHLOROTHIAZIDE 12.5; 2 MG/1; MG/1
1 TABLET ORAL DAILY
Qty: 90 TAB | Refills: 1 | Status: SHIPPED | OUTPATIENT
Start: 2019-02-07 | End: 2020-03-01

## 2019-02-07 NOTE — PROGRESS NOTES
1. Have you been to the ER, urgent care clinic since your last visit? Hospitalized since your last visit? No    2. Have you seen or consulted any other health care providers outside of the [address] [place] [address] since your last visit? Include any pap smears or colon screening.  No    Chief Complaint   Patient presents with    Back Pain     Tingling sensation 2 weeks ago     Visit Vitals  BP (!) 157/93   Pulse 83   Temp 97.6 °F (36.4 °C) (Oral)   Resp 20   Ht 5' 3\" (1.6 m)   Wt 223 lb (101.2 kg)   SpO2 99%   BMI 39.50 kg/m²

## 2019-02-07 NOTE — PATIENT INSTRUCTIONS
Numbness and Tingling: Care Instructions  Your Care Instructions    Many things can cause numbness or tingling. Swelling may put pressure on a nerve. This could cause you to lose feeling or have a pins-and-needles sensation on part of your body. Nerves may be damaged from trauma, toxins, or diseases, such as diabetes or multiple sclerosis (MS). Sometimes, though, the cause is not clear. If there is no clear reason for your symptoms, and you are not having any other symptoms, your doctor may suggest watching and waiting for a while to see if the numbness or tingling goes away on its own. Your doctor may want you to have blood or nerve tests to find the cause of your symptoms. Follow-up care is a key part of your treatment and safety. Be sure to make and go to all appointments, and call your doctor if you are having problems. It's also a good idea to know your test results and keep a list of the medicines you take. How can you care for yourself at home? · If your doctor prescribes medicine, take it exactly as directed. Call your doctor if you think you are having a problem with your medicine. · If you have any swelling, put ice or a cold pack on the area for 10 to 20 minutes at a time. Put a thin cloth between the ice and your skin. When should you call for help? Call 911 anytime you think you may need emergency care. For example, call if:    · You have weakness, numbness, or tingling in both legs.     · You lose bowel or bladder control.     · You have symptoms of a stroke. These may include:  ? Sudden numbness, tingling, weakness, or loss of movement in your face, arm, or leg, especially on only one side of your body. ? Sudden vision changes. ? Sudden trouble speaking. ? Sudden confusion or trouble understanding simple statements. ? Sudden problems with walking or balance.   ? A sudden, severe headache that is different from past headaches.    Watch closely for changes in your health, and be sure to contact your doctor if you have any problems, or if:    · You do not get better as expected. Where can you learn more? Go to http://nicol-gurdeep.info/. Enter K757 in the search box to learn more about \"Numbness and Tingling: Care Instructions. \"  Current as of: [place] 3, 2018  Content Version: 11.9  © 9951-1944 [employer]. Care instructions adapted under license by [employer] (which disclaims liability or warranty for this information). If you have questions about a medical condition or this instruction, always ask your healthcare professional. [place] [address] any warranty or liability for your use of this information.

## 2019-02-07 NOTE — PROGRESS NOTES
[facility]    CC: Tingling    HPI:     Tingling:  - Timing/onset: Started ~2 weeks ago  - Duration: Comes and goes. Occurs around 6 times a day. Last around one minute. - Location: Down lower arms to fingers  - Quality: Tingling and numbness  - Severity: Left arm is worse that right  - Progression/Course: Unchanged  - Associated Symptoms/signs: Denies   - Notable pertinent PMH of hypokalemia (Report similar issue when she was hypokalemic that resolved with treatment)      Lower Back:  -Started 1.5 weeks ago  -Across lower back  -Has not been there in 2 days  -Tried heating pad, water, and cranberry juice  -water and heat seemed to help      Hypertension:  -Does not check blood pressure at home  -Is currently only taking her amlodipine regimen, as she ran out of her HCTZ  -Denies any side effects from her blood pressure medications      ROS: Positive items marked in RED  CON: fever, chills  Cardiovascular: palpitations, CP  Resp: SOB, cough  GI: nausea, vomiting, diarrhea  : dysuria, hematuria      Past Medical History:   Diagnosis Date    GERD (gastroesophageal reflux disease)     Hypertension        Past Surgical History:   Procedure Laterality Date    HX HEENT      left eye surgery for lazy eye       Family History   Problem Relation Age of Onset    Diabetes Father        Social History     Socioeconomic History    Marital status: UNKNOWN     Spouse name: Not on file    Number of children: Not on file    Years of education: Not on file    Highest education level: Not on file   Occupational History    Occupation: CNA   Tobacco Use    Smoking status: Never Smoker    Smokeless tobacco: Never Used   Substance and Sexual Activity    Alcohol use: No    Drug use: No    Sexual activity: Yes     Partners: Male     Birth control/protection: Condom       No Known Allergies      Current Outpatient Medications:     raNITIdine (ZANTAC) 150 mg tablet, Take 1 Tab by mouth two (2) times a day. , Disp: 180 Tab, Rfl: 6    hydroCHLOROthiazide (HYDRODIURIL) 25 mg tablet, TAKE 1 TABLET BY MOUTH DAILY, Disp: 90 Tab, Rfl: 0    amLODIPine (NORVASC) 10 mg tablet, Take 1 Tab by mouth daily. , Disp: 90 Tab, Rfl: 1    Physical Exam:      BP (!) 146/99   Pulse 83   Temp 97.6 °F (36.4 °C) (Oral)   Resp 20   Ht 5' 3\" (1.6 m)   Wt 223 lb (101.2 kg)   LMP 01/20/2019   SpO2 99%   BMI 39.50 kg/m²     General: obese habitus, NAD, conversant  Eyes: sclera clear bilaterally, no discharge noted, eyelids normal in appearance  HENT: NCAT  Lungs: CTAB, normal respiratory effort and rate  CV: RRR, no MRGs  ABD: soft, no CVA tenderness bilaterally, mild suprapubic tenderness with deep palpation, no guarding, non-distended, normal bowel sounds  Ext: no peripheral edema or digital cyanosis noted. Elbow, arm, and hand examination normal bilaterally  Skin: normal temperature, turgor, color, and texture  Psych: alert and oriented to person, place and situation, normal affect  Neuro: elbow flexion, Tinel and Phalen tests are negative bilaterally, speech normal, moving all extremities, gait normal    Results for Destiny Hill (MRN [id number]):   Ref.  Range 2/7/2019 10:32   Color (UA POC) Unknown Colorless   Clarity (UA POC) Unknown Clear   Specific gravity (UA POC) Latest Ref Range: 1.001 - 1.035  1.015   pH (UA POC) Latest Ref Range: 4.6 - 8.0  7.0   Protein (UA POC) Latest Ref Range: Negative  Negative   Glucose (UA POC) Latest Ref Range: Negative  Negative   Ketones (UA POC) Latest Ref Range: Negative  Negative   Blood (UA POC) Latest Ref Range: Negative  Negative   Bilirubin (UA POC) Latest Ref Range: Negative  Negative   Urobilinogen (UA POC) Latest Ref Range: 0.2 - 1  0.2 mg/dL   Nitrites (UA POC) Latest Ref Range: Negative  Negative   Leukocyte esterase (UA POC) Latest Ref Range: Negative  Negative       Assessment/Plan     Hypertension, inadequately controlled:  -Likely inadequately controlled due to running out of HCTZ  -Will reduce dose of HCTZ due to reported issues with hypokalemia  -Will start on lisinopril due to microalbuminuria  -CMP, CBC, and urine microalbumin/creatinine ordered  -Follow-up in 1 month      Bilateral Flank Pain, resolved:  -Suspect she possibly had a UTI with current suprapubic tenderness due to residual inflammation/irritation  -CMP and CBC ordered  -Follow-up as needed if symptoms return      Numbness and Tingling:  -Etiology unclear.   -DDX includes cubital tunnel syndrome or radial tunnel syndrome  -May possibly be due to hypokalemia given reported history  -Prior hypokalemia maybe due to HCTZ use  -CMP ordered  -Handout given on numbness and tingling care  -Follow-up in 1 month (or sooner if needed)        Makayla Rondon MD  2/7/2019, 10:16 AM

## 2019-02-08 LAB
A-G RATIO,AGRAT: 1.4 RATIO (ref 1.1–2.6)
ABSOLUTE LYMPHOCYTE COUNT, 10803: 2.5 K/UL (ref 1–4.8)
ALBUMIN SERPL-MCNC: 4.1 G/DL (ref 3.5–5)
ALP SERPL-CCNC: 53 U/L (ref 25–115)
ALT SERPL-CCNC: 14 U/L (ref 5–40)
ANION GAP SERPL CALC-SCNC: 14 MMOL/L
AST SERPL W P-5'-P-CCNC: 11 U/L (ref 10–37)
BASOPHILS # BLD: 0 K/UL (ref 0–0.2)
BASOPHILS NFR BLD: 0 % (ref 0–2)
BILIRUB SERPL-MCNC: 0.3 MG/DL (ref 0.2–1.2)
BUN SERPL-MCNC: 10 MG/DL (ref 6–22)
CALCIUM SERPL-MCNC: 8.7 MG/DL (ref 8.4–10.5)
CHLORIDE SERPL-SCNC: 103 MMOL/L (ref 98–110)
CO2 SERPL-SCNC: 26 MMOL/L (ref 20–32)
CREAT SERPL-MCNC: 0.8 MG/DL (ref 0.5–1.2)
CREATININE, URINE: 40 MG/DL
EOSINOPHIL # BLD: 0.1 K/UL (ref 0–0.5)
EOSINOPHIL NFR BLD: 1 % (ref 0–6)
ERYTHROCYTE [DISTWIDTH] IN BLOOD BY AUTOMATED COUNT: 15.4 % (ref 10–15.5)
GFRAA, 66117: >60
GFRNA, 66118: >60
GLOBULIN,GLOB: 3 G/DL (ref 2–4)
GLUCOSE SERPL-MCNC: 77 MG/DL (ref 70–99)
GRANULOCYTES,GRANS: 52 % (ref 40–75)
HCT VFR BLD AUTO: 39.6 % (ref 35.1–46.5)
HGB BLD-MCNC: 12.4 G/DL (ref 11.7–15.5)
LYMPHOCYTES, LYMLT: 38 % (ref 20–45)
MCH RBC QN AUTO: 27 PG (ref 26–34)
MCHC RBC AUTO-ENTMCNC: 31 G/DL (ref 31–36)
MCV RBC AUTO: 87 FL (ref 80–95)
MICROALB/CREAT RATIO, 140286: NORMAL MCG/MG OF CREATININE (ref 0–30)
MICROALBUMIN,URINE RANDOM 140054: <12 MCG/ML (ref 0.1–17)
MONOCYTES # BLD: 0.6 K/UL (ref 0.1–1)
MONOCYTES NFR BLD: 8 % (ref 3–12)
NEUTROPHILS # BLD AUTO: 3.5 K/UL (ref 1.8–7.7)
PLATELET # BLD AUTO: 374 K/UL (ref 140–440)
PMV BLD AUTO: 10.8 FL (ref 9–13)
POTASSIUM SERPL-SCNC: 4.5 MMOL/L (ref 3.5–5.5)
PROT SERPL-MCNC: 7.1 G/DL (ref 6.4–8.3)
RBC # BLD AUTO: 4.54 M/UL (ref 3.8–5.2)
SODIUM SERPL-SCNC: 143 MMOL/L (ref 133–145)
WBC # BLD AUTO: 6.6 K/UL (ref 4–11)

## 2019-03-25 ENCOUNTER — OFFICE VISIT (OUTPATIENT)
Dept: FAMILY MEDICINE CLINIC | Age: 29
End: 2019-03-25

## 2019-03-25 VITALS
RESPIRATION RATE: 16 BRPM | TEMPERATURE: 98 F | HEART RATE: 80 BPM | SYSTOLIC BLOOD PRESSURE: 119 MMHG | WEIGHT: 216 LBS | HEIGHT: 63 IN | OXYGEN SATURATION: 96 % | DIASTOLIC BLOOD PRESSURE: 68 MMHG | BODY MASS INDEX: 38.27 KG/M2

## 2019-03-25 DIAGNOSIS — R20.2 NUMBNESS AND TINGLING OF UPPER AND LOWER EXTREMITIES OF BOTH SIDES: ICD-10-CM

## 2019-03-25 DIAGNOSIS — R20.0 NUMBNESS AND TINGLING OF UPPER AND LOWER EXTREMITIES OF BOTH SIDES: ICD-10-CM

## 2019-03-25 DIAGNOSIS — I10 ESSENTIAL HYPERTENSION: Primary | ICD-10-CM

## 2019-03-25 NOTE — PROGRESS NOTES
Chief Complaint   Patient presents with    Follow-up     1 month follow up for HTN     1. Have you been to the ER, urgent care clinic since your last visit? Hospitalized since your last visit? No.    2. Have you seen or consulted any other health care providers outside of the [address] [place] [address] since your last visit? Include any pap smears.  No.    Visit Vitals  /68   Pulse 80   Temp 98 °F (36.7 °C) (Oral)   Resp 16   Ht 5' 3\" (1.6 m)   Wt 216 lb (98 kg)   SpO2 96%   BMI 38.26 kg/m²

## 2019-03-25 NOTE — PROGRESS NOTES
[facility]    CC: F/U for HTN    HPI:       Tingling:  -Got request blood work  -Has worsened  -Legs are now also associated with numbness. Arm and legs are also now having myalgias  -She is concerned that she has fibromyalgia  -States symptoms are worse in right arm and left leg      HTN:  -Got requested lab work  -Does not exercise  -Not regularly checking BP, but checked it 3 days a go and SBP was 118  Taking BP medication as prescribed  -Denies any side effects or issues the medication      ROS: Positive items marked in RED  CON: fever, chills  Cardiovascular: palpitations, CP  Resp: SOB, cough  GI: nausea, vomiting, diarrhea  : dysuria, hematuria      Past Medical History:   Diagnosis Date    GERD (gastroesophageal reflux disease)     Hypertension        Past Surgical History:   Procedure Laterality Date    HX HEENT      left eye surgery for lazy eye       Family History   Problem Relation Age of Onset    Diabetes Father        Social History     Socioeconomic History    Marital status: UNKNOWN     Spouse name: Not on file    Number of children: Not on file    Years of education: Not on file    Highest education level: Not on file   Occupational History    Occupation: CNA   Tobacco Use    Smoking status: Never Smoker    Smokeless tobacco: Never Used   Substance and Sexual Activity    Alcohol use: No    Drug use: No    Sexual activity: Yes     Partners: Male     Birth control/protection: Condom       No Known Allergies      Current Outpatient Medications:     lisinopril-hydroCHLOROthiazide (PRINZIDE, ZESTORETIC) 20-12.5 mg per tablet, Take 1 Tab by mouth daily. , Disp: 90 Tab, Rfl: 1    amLODIPine (NORVASC) 10 mg tablet, Take 1 Tab by mouth daily. , Disp: 90 Tab, Rfl: 1    raNITIdine (ZANTAC) 150 mg tablet, Take 1 Tab by mouth two (2) times a day., Disp: 180 Tab, Rfl: 6    Physical Exam:      /68   Pulse 80   Temp 98 °F (36.7 °C) (Oral)   Resp 16   Ht 5' 3\" (1.6 m) Wt 216 lb (98 kg)   LMP 03/17/2019 (Approximate)   SpO2 96%   BMI 38.26 kg/m²     General: Obese habitus, NAD, conversant  Eyes: sclera clear bilaterally, no discharge noted, eyelids normal in appearance  HENT: NCAT  Lungs: CTAB, normal respiratory effort and rate  CV: RRR, no MRGs  ABD: soft, non-tender, non-distended, normal bowel sounds  Ext: no peripheral edema or digital cyanosis noted. Skin: normal temperature, turgor, color, and texture  Psych: alert and oriented to person, place and situation, normal affect  Neuro: speech normal, moving all extremities, gait normal    Results for Melissa Lau (MRN [id number]):   Ref. Range 2/7/2019 10:37   WBC Latest Ref Range: 4.0 - 11.0 K/uL 6.6   RBC Latest Ref Range: 3.80 - 5.20 M/uL 4.54   HGB Latest Ref Range: 11.7 - 15.5 g/dL 12.4   HCT Latest Ref Range: 35.1 - 46.5 % 39.6   MCV Latest Ref Range: 80 - 95 fL 87   MCH Latest Ref Range: 26 - 34 pg 27   MCHC Latest Ref Range: 31 - 36 g/dL 31   RDW Latest Ref Range: 10.0 - 15.5 % 15.4   PLATELET Latest Ref Range: 140 - 440 K/uL 374   MPV Latest Ref Range: 9.0 - 13.0 fL 10.8   NEUTROPHILS Latest Ref Range: 40 - 75 % 52   Lymphocytes Latest Ref Range: 20 - 45 % 38   MONOCYTES Latest Ref Range: 3 - 12 % 8   EOSINOPHILS Latest Ref Range: 0 - 6 % 1   BASOPHILS Latest Ref Range: 0 - 2 % 0   ABS. NEUTROPHILS Latest Ref Range: 1.8 - 7.7 K/uL 3.5   ABS. MONOCYTES Latest Ref Range: 0.1 - 1.0 K/uL 0.6   ABS. EOSINOPHILS Latest Ref Range: 0.0 - 0.5 K/uL 0.1   ABS.  BASOPHILS Latest Ref Range: 0.0 - 0.2 K/uL 0.0   Sodium Latest Ref Range: 133 - 145 mmol/L 143   Potassium Latest Ref Range: 3.5 - 5.5 mmol/L 4.5   Chloride Latest Ref Range: 98 - 110 mmol/L 103   CO2 Latest Ref Range: 20 - 32 mmol/L 26   Anion gap Latest Units: mmol/L 14.0   Glucose Latest Ref Range: 70 - 99 mg/dL 77   BUN Latest Ref Range: 6 - 22 mg/dL 10   Creatinine Latest Ref Range: 0.5 - 1.2 mg/dL 0.8   Calcium Latest Ref Range: 8.4 - 10.5 mg/dL 8.7   Bilirubin, total Latest Ref Range: 0.2 - 1.2 mg/dL 0.3   Protein, total Latest Ref Range: 6.4 - 8.3 g/dL 7.1   Albumin Latest Ref Range: 3.5 - 5.0 g/dL 4.1   Globulin Latest Ref Range: 2.0 - 4.0 g/dL 3.0   A-G Ratio Latest Ref Range: 1.1 - 2.6 ratio 1.4   ALT (SGPT) Latest Ref Range: 5 - 40 U/L 14   AST Latest Ref Range: 10 - 37 U/L 11   Alk. phosphatase Latest Ref Range: 25 - 115 U/L 53   Creatinine, urine Latest Units: mg/dL 40   Microalbumin, urine Latest Ref Range: 0.1 - 17.0 mcg/mL <12.0   Microalbumin/Creat.  Ratio Latest Ref Range: 0.0 - 30.0 mcg/mg of Creatinine Pend         Assessment/Plan     HTN, well controlled:  -Will continue current blood pressure medication regimen  -Follow-up in 3 months for chronic disease management      Numbness and Tingling of Upper and Lower Extremities of Both Sides:  -Etiology unclear  -Will refer to neurology for further evaluation  -Follow-up in 3 months for chronic disease management        Justo Saez MD  3/25/2019, 1:12 PM

## 2019-06-25 ENCOUNTER — OFFICE VISIT (OUTPATIENT)
Dept: FAMILY MEDICINE CLINIC | Age: 29
End: 2019-06-25

## 2019-06-25 VITALS
BODY MASS INDEX: 38.27 KG/M2 | HEART RATE: 59 BPM | TEMPERATURE: 98 F | SYSTOLIC BLOOD PRESSURE: 138 MMHG | OXYGEN SATURATION: 98 % | HEIGHT: 63 IN | WEIGHT: 216 LBS | RESPIRATION RATE: 14 BRPM | DIASTOLIC BLOOD PRESSURE: 83 MMHG

## 2019-06-25 DIAGNOSIS — R20.0 NUMBNESS AND TINGLING OF RIGHT ARM: ICD-10-CM

## 2019-06-25 DIAGNOSIS — I10 ESSENTIAL HYPERTENSION: Primary | ICD-10-CM

## 2019-06-25 DIAGNOSIS — R20.2 NUMBNESS AND TINGLING OF RIGHT ARM: ICD-10-CM

## 2019-06-25 DIAGNOSIS — K21.9 GASTROESOPHAGEAL REFLUX DISEASE, ESOPHAGITIS PRESENCE NOT SPECIFIED: ICD-10-CM

## 2019-06-25 RX ORDER — AMLODIPINE BESYLATE 10 MG/1
10 TABLET ORAL DAILY
Qty: 90 TAB | Refills: 1 | Status: SHIPPED | OUTPATIENT
Start: 2019-06-25 | End: 2020-07-27

## 2019-06-25 NOTE — PROGRESS NOTES
1. Have you been to the ER, urgent care clinic since your last visit? Hospitalized since your last visit? No    2. Have you seen or consulted any other health care providers outside of the [address] [place] [address] since your last visit? Include any pap smears or colon screening.  No

## 2019-06-25 NOTE — PATIENT INSTRUCTIONS
Electromyogram (EMG) and Nerve Conduction Studies: About These Tests  What are they? An electromyogram (EMG) measures the electrical activity of your muscles when you are not using them (at rest) and when you tighten them (muscle contraction). Nerve conduction studies (NCS) measure how well and how fast the nerves can send electrical signals. EMG and nerve conduction studies are often done together. If they are done together, the nerve conduction studies are done before the EMG. Why are they done? You may need an EMG to find diseases that damage your muscles or nerves or to find why you cannot move your muscles (paralysis), why they feel weak, or why they twitch. You may need nerve conduction studies to find damage to the nerves that lead from the brain and spinal cord to the rest of the body (peripheral nervous system). Nerve conduction studies are often used to help find nerve disorders, such as carpal tunnel syndrome. How can you prepare for these tests? · Tell your doctors ALL the medicines, vitamins, supplements, and herbal remedies you take. Some medicines can affect the test results. You may need to stop taking some medicines before you have this test.     · If you take aspirin or some other blood thinner, be sure to talk to your doctor. He or she will tell you if you should stop taking it before your test. Make sure that you understand exactly what your doctor wants you to do.     · Wear loose-fitting clothing. You may be given a hospital gown to wear.     · The electrodes for the test are attached to your skin. Your skin needs to be clean and free of sprays, oils, creams, and lotions. What happens during the tests? You lie on a table or bed or sit in a reclining chair so your muscles are relaxed. For an EMG:  · Your doctor will insert a needle electrode into a muscle.  This will record the electrical activity while the muscle is at rest. You may feel a quick, sharp pain when the needle electrode is put into a muscle. · Your doctor will ask you to tighten the same muscle slowly and steadily while the electrical activity is recorded. · Your doctor may move the electrode to a different area of the muscle or a different muscle. For nerve conduction studies:  · Your doctor will attach two types of electrodes to your skin. ? One type of electrode is placed over a nerve and will give the nerve an electrical pulse. ? The other type of electrode is placed over the muscle that the nerve controls. It will record how long it takes the muscle to react to the electrical pulse. · You will be able to feel the electrical pulses. They are small shocks and are safe. What else should you know about these tests? · After an EMG, you may be sore and have a tingling feeling in your muscles for up to 2 days. You may have small bruises or swelling at the needle site. · For an EMG, you may be asked to sign a consent form. Talk to your doctor about any concerns you have about the need for the test, its risks, how it will be done, or what the results will mean. How long do they take? · An EMG may take 30 to 60 minutes. · Nerve conduction tests may take from 15 minutes to 1 hour or more. It depends on how many nerves and muscles your doctor tests. What happens after these tests? · If any of the test areas are sore:  ? Put ice or a cold pack on the area for 10 to 20 minutes at a time. Put a thin cloth between the ice and your skin. ? Take an over-the-counter pain medicine, such as acetaminophen (Tylenol), ibuprofen (Advil, Motrin), or naproxen (Aleve). Be safe with medicines. Read and follow all instructions on the label. · You will probably be able to go home right away. · You can go back to your usual activities right away. When should you call for help?   Watch closely for changes in your health, and be sure to contact your doctor if:  · Muscle pain from an EMG test gets worse or you have swelling, tenderness, or pus at any of the needle sites. · You have any problems that you think may be from the test.  · You have any questions about the test or have not received your results. Follow-up care is a key part of your treatment and safety. Be sure to make and go to all appointments, and call your doctor if you are having problems. It's also a good idea to keep a list of the medicines you take. Ask your doctor when you can expect to have your test results. Where can you learn more? Go to http://nicol-gurdeep.info/. Enter Z760 in the search box to learn more about \"Electromyogram (EMG) and Nerve Conduction Studies: About These Tests. \"  Current as of: [place] 3, 2018  Content Version: 11.9  © 2177-9877 [employer]. Care instructions adapted under license by [employer] (which disclaims liability or warranty for this information). If you have questions about a medical condition or this instruction, always ask your healthcare professional. [address] any warranty or liability for your use of this information.

## 2019-06-25 NOTE — PROGRESS NOTES
[facility]    CC: F/U for chronic disease management    HPI:     Numbness and Tingling:  -Continues to report having issue in right arm  -Saw neurology 2 months ago  -Was told she may have carpal tunnel syndrome  -Tried wearing a brace as recommended  -Brace did not help  -Told she would need nerve testing if it did not improve  -Next appointment is in August      HTN:  -Does not exercise  -Has not been checking BP at home  -Taking BP medication as prescribed  -Denies any side effects or issues with BP medication      GERD:  -Taking ranitidine as prescribed  -Reports medication works well  -Denies any side effects from medication      ROS: Positive items marked in RED  CON: fever, chills  Cardiovascular: palpitations, CP  Resp: SOB, cough  GI: nausea, vomiting, diarrhea  : dysuria, hematuria      Past Medical History:   Diagnosis Date    GERD (gastroesophageal reflux disease)     Hypertension        Past Surgical History:   Procedure Laterality Date    HX HEENT      left eye surgery for lazy eye       Family History   Problem Relation Age of Onset    Diabetes Father        Social History     Socioeconomic History    Marital status: UNKNOWN     Spouse name: Not on file    Number of children: Not on file    Years of education: Not on file    Highest education level: Not on file   Occupational History    Occupation: CNA   Tobacco Use    Smoking status: Never Smoker    Smokeless tobacco: Never Used   Substance and Sexual Activity    Alcohol use: No    Drug use: No    Sexual activity: Yes     Partners: Male     Birth control/protection: Condom       No Known Allergies      Current Outpatient Medications:     lisinopril-hydroCHLOROthiazide (PRINZIDE, ZESTORETIC) 20-12.5 mg per tablet, Take 1 Tab by mouth daily. , Disp: 90 Tab, Rfl: 1    raNITIdine (ZANTAC) 150 mg tablet, Take 1 Tab by mouth two (2) times a day., Disp: 180 Tab, Rfl: 6    amLODIPine (NORVASC) 10 mg tablet, Take 1 Tab by mouth daily. , Disp: 90 Tab, Rfl: 1    Physical Exam:      /83   Pulse (!) 59   Temp 98 °F (36.7 °C) (Oral)   Resp 14   Ht 5' 3\" (1.6 m)   Wt 216 lb (98 kg)   LMP 06/16/2019 (Approximate)   SpO2 98%   BMI 38.26 kg/m²     General:  WD, WN, NAD, conversant  Eyes: sclera clear bilaterally, no discharge noted, eyelids normal in appearance  HENT: NCAT, nasal turbinates, oropharynx clear, MMM  Neck: supple, no lymphadenopathy  Lungs: CTAB, normal respiratory effort and rate  CV: RRR, no MRGs  ABD: soft, non-tender, non-distended, normal bowel sounds  Ext: no peripheral edema or digital cyanosis noted  Skin: normal temperature, turgor, color, and texture  Psych: alert and oriented to person, place and situation, normal affect  Neuro: speech normal, moving all extremities, gait normal      Assessment/Plan     HTN, well controlled:  -At goal of <140/90  -Will continue current blood pressure medication regimen  -Follow-up in 4 months for well visit       GERD, well controlled:  -Will continue current ranitidine regimen  -Follow-up in 4 months for well visit       Numbness and Tingling of Right Arm:  -Etiology unclear.  Suspected to be carpal tunnel by neurology.  -Counseled on nerve testing  -Will defer further evaluation to neurology  -Handout given on nerve conduction study  -Follow-up in 4 months for well visit        Baron Rose MD  6/25/2019, 11:08 AM

## 2019-11-06 ENCOUNTER — OFFICE VISIT (OUTPATIENT)
Dept: FAMILY MEDICINE CLINIC | Age: 29
End: 2019-11-06

## 2019-11-06 VITALS
DIASTOLIC BLOOD PRESSURE: 75 MMHG | BODY MASS INDEX: 35.79 KG/M2 | HEART RATE: 80 BPM | WEIGHT: 202 LBS | HEIGHT: 63 IN | TEMPERATURE: 97.9 F | OXYGEN SATURATION: 99 % | SYSTOLIC BLOOD PRESSURE: 125 MMHG | RESPIRATION RATE: 12 BRPM

## 2019-11-06 DIAGNOSIS — Z00.00 WELL WOMAN EXAM (NO GYNECOLOGICAL EXAM): Primary | ICD-10-CM

## 2019-11-06 NOTE — PROGRESS NOTES
[facility]    CC: Well Woman Exam    HPI:     Well Woman Visit:  - 26 y.o.  single   - Her gyn and breast care is done elsewhere by her Ob-Gyn physician ([address] [place] [address]). - Menstrual Hx: Menarche: 14 y.o. Cycle: Regular ~28 days in length, Period: 4 days in duration with a normal flow, LMP: 10/16/2019  - Gyn Hx: Last pap smear was a year ago and was normal  - Sexual Hx: Active, Protection: Yes, condoms, Orientation: , Number of partners past year: 1  - STD Hx: Trichomonas and chlamydia  - Sun exposure: Yes. Using any sun protection: None  - Prenatal vitamins or calcium supplement: None  - Other srini providers seen: None  - Physical activity: Follows no regular exercise regimen  - Diet: Low sodium diet and no fried foods, dairy: None  - Caffeine: One soda every 3 days  - Safety: Wears seat belt in vehicles and has smoke detectors in home  - Reports she feels safe at home and in her neighborhood. Reports she feels her family is safe at home and in her neighborhood.        ROS: Positive items marked in RED  CON: fever, chills  Eyes: eye pain, vision loss  ENT: ear pain, sore throat  Cardiovascular: CP, palpitations, swelling of lower extremities  Resp: cough, SOB  Lymph/Heme: swollen/enlarged lymph nodes, tender/painful lymph nodes  GI: nausea, vomiting, diarrhea, hematochezia, melena  SKIN: rash, itching, growths  Breast: lumps/bumps, pain, nipple changes/discharge  : dysuria, hematuria, abnormal vaginal discharge/bleeding  MS: arthralgias, myalgias  Neuro: numbness, weakness  Psych: depression, anxiety    Past Medical History:   Diagnosis Date    Carpal tunnel syndrome, bilateral     GERD (gastroesophageal reflux disease)     Hypertension        GYN History           OB History        2    Para   2    Term   2            AB        Living   2       SAB        TAB        Ectopic        Molar        Multiple        Live Births                    Past Surgical History: Procedure Laterality Date    HX HEENT      left eye surgery for lazy eye       Family History   Problem Relation Age of Onset    Diabetes Father        Social History     Socioeconomic History    Marital status: SINGLE     Spouse name: Not on file    Number of children: Not on file    Years of education: Not on file    Highest education level: Not on file   Occupational History    Occupation: CNA   Tobacco Use    Smoking status: Never Smoker    Smokeless tobacco: Never Used   Substance and Sexual Activity    Alcohol use: No    Drug use: No    Sexual activity: Yes     Partners: Male     Birth control/protection: Condom       No Known Allergies      Current Outpatient Medications:     amLODIPine (NORVASC) 10 mg tablet, Take 1 Tab by mouth daily. , Disp: 90 Tab, Rfl: 1    lisinopril-hydroCHLOROthiazide (PRINZIDE, ZESTORETIC) 20-12.5 mg per tablet, Take 1 Tab by mouth daily. , Disp: 90 Tab, Rfl: 1    raNITIdine (ZANTAC) 150 mg tablet, Take 1 Tab by mouth two (2) times a day., Disp: 180 Tab, Rfl: 6    Physical Exam:      /75   Pulse 80   Temp 97.9 °F (36.6 °C) (Oral)   Resp 12   Ht 5' 3\" (1.6 m)   Wt 202 lb (91.6 kg)   LMP 10/16/2019   SpO2 99%   BMI 35.78 kg/m²     General: obese habitus, NAD, conversant  Eyes: sclera clear bilaterally, no discharge noted, eyelids normal in appearance, PERRLA, EOMI  HENT: NCAT, oropharynx clear, MMM  Neck: supple, midline trachea  Lungs: CTAB, Normal respiratory effort and rate  CV: RRR, no MRGs  ABD: soft, non-tender, non-distended, normal bowel sounds  Ext: no peripheral edema or digital cyanosis noted  Skin: normal temperature, turgor, color, and texture  Psych: alert and oriented to person, place and time, normal affect  Neuro: speech normal, moving all extremities, gait normal,  5/5 strength in upper/lower extremities, sensation and CN II-XII grossly intact    *Breast and Pelvic exams were deferred*      Assessment/Plan     Well Woman Visit:  -Farhat Daughters on recommended life style changes, importance of seeing a dentist regularly for her oral healthcare, and importance of using sun protection for prevention of skin cancer/damage  -Handouts given on well visit care, skin damage/cancer prevention, and dental care  -F/U in 6 months        Phoebe Santos MD  11/6/2019, 8:44 AM

## 2019-11-06 NOTE — PATIENT INSTRUCTIONS
Well Visit, Ages 25 to 48: Care Instructions  Your Care Instructions    Physical exams can help you stay healthy. Your doctor has checked your overall health and may have suggested ways to take good care of yourself. He or she also may have recommended tests. At home, you can help prevent illness with healthy eating, regular exercise, and other steps. Follow-up care is a key part of your treatment and safety. Be sure to make and go to all appointments, and call your doctor if you are having problems. It's also a good idea to know your test results and keep a list of the medicines you take. How can you care for yourself at home? · Reach and stay at a healthy weight. This will lower your risk for many problems, such as obesity, diabetes, heart disease, and high blood pressure. · Get at least 30 minutes of physical activity on most days of the week. Walking is a good choice. You also may want to do other activities, such as running, swimming, cycling, or playing tennis or team sports. Discuss any changes in your exercise program with your doctor. · Do not smoke or allow others to smoke around you. If you need help quitting, talk to your doctor about stop-smoking programs and medicines. These can increase your chances of quitting for good. · Talk to your doctor about whether you have any risk factors for sexually transmitted infections (STIs). Having one sex partner (who does not have STIs and does not have sex with anyone else) is a good way to avoid these infections. · Use birth control if you do not want to have children at this time. Talk with your doctor about the choices available and what might be best for you. · Protect your skin from too much sun. When you're outdoors from 10 a.m. to 4 p.m., stay in the shade or cover up with clothing and a hat with a wide brim. Wear sunglasses that block UV rays. Even when it's cloudy, put broad-spectrum sunscreen (SPF 30 or higher) on any exposed skin.   · See a dentist one or two times a year for checkups and to have your teeth cleaned. · Wear a seat belt in the car. Follow your doctor's advice about when to have certain tests. These tests can spot problems early. For everyone  · Cholesterol. Have the fat (cholesterol) in your blood tested after age 21. Your doctor will tell you how often to have this done based on your age, family history, or other things that can increase your risk for heart disease. · Blood pressure. Have your blood pressure checked during a routine doctor visit. Your doctor will tell you how often to check your blood pressure based on your age, your blood pressure results, and other factors. · Vision. Talk with your doctor about how often to have a glaucoma test.  · Diabetes. Ask your doctor whether you should have tests for diabetes. · Colon cancer. Your risk for colorectal cancer gets higher as you get older. Some experts say that adults should start regular screening at age 48 and stop at age 76. Others say to start before age 48 or continue after age 76. Talk with your doctor about your risk and when to start and stop screening. For women  · Breast exam and mammogram. Talk to your doctor about when you should have a clinical breast exam and a mammogram. Medical experts differ on whether and how often women under 50 should have these tests. Your doctor can help you decide what is right for you. · Cervical cancer screening test and pelvic exam. Begin with a Pap test at age 24. The test often is part of a pelvic exam. Starting at age 27, you may choose to have a Pap test, an HPV test, or both tests at the same time (called co-testing). Talk with your doctor about how often to have testing. · Tests for sexually transmitted infections (STIs). Ask whether you should have tests for STIs. You may be at risk if you have sex with more than one person, especially if your partners do not wear condoms.   For men  · Tests for sexually transmitted infections (STIs). Ask whether you should have tests for STIs. You may be at risk if you have sex with more than one person, especially if you do not wear a condom. · Testicular cancer exam. Ask your doctor whether you should check your testicles regularly. · Prostate exam. Talk to your doctor about whether you should have a blood test (called a PSA test) for prostate cancer. Experts differ on whether and when men should have this test. Some experts suggest it if you are older than 39 and are -American or have a father or brother who got prostate cancer when he was younger than 72. When should you call for help? Watch closely for changes in your health, and be sure to contact your doctor if you have any problems or symptoms that concern you. Where can you learn more? Go to http://nicol-gurdeep.info/. Enter P072 in the search box to learn more about \"Well Visit, Ages 25 to 48: Care Instructions. \"  Current as of: December 13, 2018  Content Version: 12.2  © 6547-7594 [employer]. Care instructions adapted under license by [employer] (which disclaims liability or warranty for this information). If you have questions about a medical condition or this instruction, always ask your healthcare professional. [place] [address] any warranty or liability for your use of this information. Learning About Sun Damage and Your Skin  How does the sun affect your skin? Being out in the sun can be good for you. Sunlight can brighten your mood and help you feel healthier. But getting too much of the ultraviolet (UV) rays in sunlight or from a tanning bed can harm your skin. The damage may include:  · Skin changes like early wrinkling, sagging skin, and age spots. · Skin cancer. The lighter your skin is, the more easily it can be damaged by the sun. But everyone can benefit from protecting their skin from the sun, regardless of skin color.   The amount of skin damage you can get from sunlight depends on:  · The time of day. You are more likely to get a sunburn in the middle of the day. You might think that exposure to the sun isn't a problem on cloudy days, but the sun's UV rays can still pass through clouds. · Whether you are near reflective surfaces, such as water, white sand, concrete, snow, or ice. All of these reflect the sun's UV rays. · The season of the year. The intense sunlight of summer days can cause more skin damage than the sunlight in other seasons. How can you protect your skin from the sun? Most sunburn and skin cancer can be prevented. Use the following tips to protect your skin. Avoid exposure to the sun  · Try to spend less time in the sun from 10 in the morning to 4 in the afternoon. Find shade if you need to be outdoors. · Wear clothing that blocks the sun. This can be a wide-brimmed hat that covers your neck, ears, eyes, and scalp. It can also include loose-fitting, tightly-woven clothes that cover your arms and legs. · Wear sunglasses that block UV rays. Use sunscreen  · Always wear sunscreen on exposed skin. Make sure to use a broad-spectrum sunscreen that has a sun protection factor (SPF) of 30 or higher. Use it every day, even when it is cloudy. · Apply sunscreen at least 30 minutes before you go out in the sun. Put on more every 2 to 3 hours while you are in the sun and after you sweat a lot or swim. · Take extra care to protect your skin when you're near water, at higher elevations, or in tropical climates. · Use a broad-spectrum lip balm or cream that has SPF of 30 to protect your lips from getting sunburned. Where can you learn more? Go to http://nicol-gurdeep.info/. Enter R455 in the search box to learn more about \"Learning About Sun Damage and Your Skin. \"  Current as of: April 1, 2019  Content Version: 12.2  © 3110-7640 "[employer].  Care instructions adapted under license by [facility] (which disclaims liability or warranty for this information). If you have questions about a medical condition or this instruction, always ask your healthcare professional. [address] any warranty or liability for your use of this information. Skin Cancer Prevention: Care Instructions  Your Care Instructions    Skin cancer is the abnormal growth of cells in the skin. It usually appears as a growth that changes in color, shape, or size. This can be a sore that does not heal or a change in a wart or a mole. Skin cancer is almost always curable when found early and treated. So it is important to see your doctor if you have any of these changes in your skin. Skin cancer is the most common type of cancer. It often appears on areas of the body that have been exposed to the sun, such as the head, face, neck, back, chest, or shoulders. Follow-up care is a key part of your treatment and safety. Be sure to make and go to all appointments, and call your doctor if you are having problems. It's also a good idea to know your test results and keep a list of the medicines you take. How can you care for yourself at home? · Wear a wide-brimmed hat and long sleeves and pants if you are going to be outdoors for a long time. · Avoid the sun between 10 a.m. and 4 p.m., which is the peak time for UV rays. · Wear sunscreen on exposed skin. Make sure to use a broad-spectrum sunscreen that has a sun protection factor (SPF) of 30 or higher. Use it every day, even when it is cloudy. · Do not use tanning booths or sunlamps. · Use lip balm or cream that has sun protection factor (SPF) to protect your lips from getting sunburned. · Wear sunglasses that block UV rays. When should you call for help? Call your doctor now or seek immediate medical care if:    · You have signs of infection, such as:  ? Increased pain, swelling, warmth, or redness. ? Red streaks leading from the area. ?  Pus draining from the area. ? A fever.    Watch closely for changes in your health, and be sure to contact your doctor if:    · You see a change in your skin, such as a growth or mole that:  ? Grows bigger. This may happen very slowly. ? Changes color. ? Changes shape. ? Starts to bleed easily.     · You have swollen glands in your armpits, groin, or neck.     · You do not get better as expected. Where can you learn more? Go to http://nicol-gurdeep.info/. Enter P392 in the search box to learn more about \"Skin Cancer Prevention: Care Instructions. \"  Current as of: December 19, 2018  Content Version: 12.2  © 6359-7593 [email or web address]. Care instructions adapted under license by [employer] (which disclaims liability or warranty for this information). If you have questions about a medical condition or this instruction, always ask your healthcare professional. [place] [address] any warranty or liability for your use of this information. Skin Cancer Prevention: Care Instructions  Your Care Instructions    Skin cancer is the abnormal growth of cells in the skin. It usually appears as a growth that changes in color, shape, or size. This can be a sore that does not heal or a change in a wart or a mole. Skin cancer is almost always curable when found early and treated. So it is important to see your doctor if you have any of these changes in your skin. Skin cancer is the most common type of cancer. It often appears on areas of the body that have been exposed to the sun, such as the head, face, neck, back, chest, or shoulders. Follow-up care is a key part of your treatment and safety. Be sure to make and go to all appointments, and call your doctor if you are having problems. It's also a good idea to know your test results and keep a list of the medicines you take. How can you care for yourself at home?   · Wear a wide-brimmed hat and long sleeves and pants if you are going to be outdoors for a long time. · Avoid the sun between 10 a.m. and 4 p.m., which is the peak time for UV rays. · Wear sunscreen on exposed skin. Make sure to use a broad-spectrum sunscreen that has a sun protection factor (SPF) of 30 or higher. Use it every day, even when it is cloudy. · Do not use tanning booths or sunlamps. · Use lip balm or cream that has sun protection factor (SPF) to protect your lips from getting sunburned. · Wear sunglasses that block UV rays. When should you call for help? Call your doctor now or seek immediate medical care if:    · You have signs of infection, such as:  ? Increased pain, swelling, warmth, or redness. ? Red streaks leading from the area. ? Pus draining from the area. ? A fever.    Watch closely for changes in your health, and be sure to contact your doctor if:    · You see a change in your skin, such as a growth or mole that:  ? Grows bigger. This may happen very slowly. ? Changes color. ? Changes shape. ? Starts to bleed easily.     · You have swollen glands in your armpits, groin, or neck.     · You do not get better as expected. Where can you learn more? Go to http://nicol-gurdeep.info/. Enter P392 in the search box to learn more about \"Skin Cancer Prevention: Care Instructions. \"  Current as of: December 19, 2018  Content Version: 12.2  © 7505-0149 [employer]. Care instructions adapted under license by [employer] (which disclaims liability or warranty for this information). If you have questions about a medical condition or this instruction, always ask your healthcare professional. [place] [address] any warranty or liability for your use of this information. Learning About Dental Care  What is basic dental care? Basic dental care involves brushing and flossing your teeth regularly to remove plaque.  Plaque is a thin film of bacteria that sticks to teeth above and below the gum line. It can build up and harden into tartar, which makes it harder to give the teeth a good cleaning. Tartar usually has to be removed by a dental hygienist.  The bacteria in plaque use sugars to make acids. These acids can damage the gums and teeth. Be sure to see your dentist and dental hygienist for regular checkups and cleanings. How can dental care affect your health? Practicing basic dental care:  · Removes plaque that can cause gum disease and tooth decay. Tooth decay can lead to a hole in the tooth (cavity). · Helps prevent bad breath. Brushing and flossing rid your mouth of the bacteria that cause bad breath. · Helps keep teeth white by preventing staining from food, drinks, and tobacco.  · Makes it possible for your teeth to last a lifetime. What can you do to prevent dental problems? · Brush your teeth twice a day, in the morning and at night. ? Use a toothbrush with soft, rounded-end bristles and a head that is small enough to reach all parts of your teeth and mouth. ? Replace your toothbrush every 3 to 4 months. ? Use a fluoride toothpaste. ? Place the brush at a 45-degree angle where the teeth meet the gums. Press firmly, and gently rock the brush back and forth using small circular movements. ? Brush chewing surfaces vigorously with short back-and-forth strokes. ? Brush your tongue from back to front. · Floss at least once a day. Choose the type and flavor you like best.  · Schedule checkups and cleanings as often as your dentist recommends it. · Eat a healthy diet to help keep your gums healthy and your teeth strong. Choose foods that are good for your teeth, such as whole grains, vegetables, fruits, and foods that are low in saturated fat and sodium. Mozzarella and other cheeses, peanuts, yogurt, and milk are good for your teeth. Sugar-free chewing gum (especially gum that contains xylitol) is also a good choice.   · Avoid foods that contain a lot of sugar, especially sticky, sweet foods like taffy. · Don't snack before bedtime. Food left on the teeth is more likely to cause tooth decay overnight. · Don't smoke or use smokeless tobacco. Tobacco can make tooth decay worse. If you need help quitting, talk to your doctor about stop-smoking programs and medicines. These can increase your chances of quitting for good. Where can you learn more? Go to http://nicol-gurdeep.info/. Enter F534 in the search box to learn more about \"Learning About Dental Care. \"  Current as of: October 3, 2018  Content Version: 12.2  © 1084-4704 [employer]. Care instructions adapted under license by [employer] (which disclaims liability or warranty for this information). If you have questions about a medical condition or this instruction, always ask your healthcare professional. [address] any warranty or liability for your use of this information.

## 2019-11-06 NOTE — PROGRESS NOTES
1. Have you been to the ER, urgent care clinic since your last visit? Hospitalized since your last visit? Yes When: 11-1-19 [facility] for abdominal pain and back pain DX UTI-resolved    2. Have you seen or consulted any other health care providers outside of the [address] [place] [address] since your last visit? Include any pap smears or colon screening.  No

## 2019-12-04 DIAGNOSIS — K21.9 GASTROESOPHAGEAL REFLUX DISEASE, ESOPHAGITIS PRESENCE NOT SPECIFIED: ICD-10-CM

## 2019-12-05 RX ORDER — RANITIDINE 150 MG/1
150 TABLET, FILM COATED ORAL 2 TIMES DAILY
Qty: 180 TAB | Refills: 6 | Status: SHIPPED | OUTPATIENT
Start: 2019-12-05 | End: 2020-06-11 | Stop reason: ALTCHOICE

## 2020-02-27 DIAGNOSIS — I10 ESSENTIAL HYPERTENSION: ICD-10-CM

## 2020-03-01 RX ORDER — LISINOPRIL AND HYDROCHLOROTHIAZIDE 12.5; 2 MG/1; MG/1
TABLET ORAL
Qty: 90 TAB | Refills: 1 | Status: SHIPPED | OUTPATIENT
Start: 2020-03-01 | End: 2021-04-08 | Stop reason: DRUGHIGH

## 2020-06-11 ENCOUNTER — VIRTUAL VISIT (OUTPATIENT)
Dept: FAMILY MEDICINE CLINIC | Age: 30
End: 2020-06-11

## 2020-06-11 DIAGNOSIS — R80.9 MICROALBUMINURIA: ICD-10-CM

## 2020-06-11 DIAGNOSIS — I10 ESSENTIAL HYPERTENSION WITH GOAL BLOOD PRESSURE LESS THAN 140/90: ICD-10-CM

## 2020-06-11 DIAGNOSIS — K21.9 GASTROESOPHAGEAL REFLUX DISEASE, ESOPHAGITIS PRESENCE NOT SPECIFIED: Primary | ICD-10-CM

## 2020-06-11 RX ORDER — FAMOTIDINE 20 MG/1
20 TABLET, FILM COATED ORAL 2 TIMES DAILY
Qty: 60 TAB | Refills: 1 | Status: SHIPPED | OUTPATIENT
Start: 2020-06-11 | End: 2020-08-26 | Stop reason: ALTCHOICE

## 2020-06-11 NOTE — PROGRESS NOTES
1. Have you been to the ER, urgent care clinic since your last visit? Hospitalized since your last visit? Yes When: [facility] for nausea, abdominal pain and diarrhea. Patient still has some abdominal pain    2. Have you seen or consulted any other health care providers outside of the [address] [place] [address] since your last visit? Include any pap smears or colon screening.  No

## 2020-06-11 NOTE — PROGRESS NOTES
[facility]    CC: Follow-up for Chronic Disease Management    HPI:     Consent: Isabel Lopes, who was seen by synchronous (real-time) audio-video technology, and/or her healthcare decision maker, is aware that this patient-initiated, Telehealth encounter on 6/11/2020 is a billable service, with coverage as determined by her insurance carrier. She is aware that she may receive a bill and has provided verbal consent to proceed: Yes.        HTN:  -Taking BP medication as prescribed  -Denies any side effects or issues with BP medication  -Has not been checking BP at home  -Not following any regular exercise regimen  -Diet is unchanged since last visit        GERD:  -Had been taking ranitidine for issue  -Denies any side effects from the medication  -Reports the medication worked well to control her symptoms  -Needs prescription for new reflux medication due to recent recall of ranitidine  -Diet is unchanged since last visit      Microalbuminuria:  -Was in issue in 2018 (Two positive urine chemistry tests)  -Urine chemistry was last checked on 2/7/2019 and issue was no longer present  -Has been taking lisinopril as prescribed  -Denies any side effects or issues with the medication  -Currently reports having no urinary issues      ROS: Positive items marked in RED  CON: fever, chills  Cardiovascular: palpitations, CP  Resp: SOB, cough  GI: nausea, vomiting, diarrhea  : dysuria, hematuria      Past Medical History:   Diagnosis Date    Carpal tunnel syndrome, bilateral     GERD (gastroesophageal reflux disease)     Hypertension     Microalbuminuria        Past Surgical History:   Procedure Laterality Date    HX HEENT      left eye surgery for lazy eye       Family History   Problem Relation Age of Onset    Diabetes Father        Social History     Tobacco Use    Smoking status: Never Smoker    Smokeless tobacco: Never Used   Substance Use Topics    Alcohol use: No    Drug use: No       No Known Allergies      Current Outpatient Medications:     lisinopril-hydroCHLOROthiazide (PRINZIDE, ZESTORETIC) 20-12.5 mg per tablet, TAKE 1 TABLET BY MOUTH DAILY, Disp: 90 Tab, Rfl: 1    raNITIdine (ZANTAC) 150 mg tablet, Take 1 Tab by mouth two (2) times a day., Disp: 180 Tab, Rfl: 6    amLODIPine (NORVASC) 10 mg tablet, Take 1 Tab by mouth daily. , Disp: 90 Tab, Rfl: 1    Physical Exam:      General: obese habitus, NAD, conversant  Eyes: sclera clear bilaterally, no discharge noted, eyelids normal in appearance, EOMI  HENT: NCAT  Neck: no masses visualized, trachea appears to be midline  Lungs: normal respiratory effort and rate, No visualized signs of difficulty breathing or respiratory distress  MS: normal AROM of neck noted  Skin: no significant exanthematous lesions or discoloration noted on neck/facial skin    Psych: alert and oriented to person, place and situation, normal affect  Neuro: speech normal, moving all upper extremities, no gaze palsy, no facial asymmetry (Cranial nerve 7 motor function) (limited exam due to video visit)      Assessment/Plan       GERD, inadequately controlled:  -Officially discontinued prior ranitidine regimen  -Started on famotidine regimen  -Follow-up in 1 month      HTN, likely well controlled:  -Current status of BP control unclear due to nature of visit type, but the past three office readings were at goal of less than 140/90  -Will continue current BP medication regimen  -CBC, CMP, fasting lipid panel, and urine microalbumin/creatinine ordered  -Follow-up in 1 month      Microalbuminuria, likely well controlled/resolved:  -Issue was likely 2/2 HTN  -Will continue current ACE therapy  -Urine microalbumin/creatinine ordered  -Follow-up in 1 month      Janes Travis is a 31 y.o. female being evaluated by a Virtual Visit (video visit) encounter to address concerns as mentioned above. A caregiver was present when appropriate.  Due to this being a TeleHealth encounter (During UTIIP-22 public health emergency), evaluation of the following organ systems was limited: Vitals/Constitutional/EENT/Resp/CV/GI//MS/Neuro/Skin/Heme-Lymph-Imm. Pursuant to the emergency declaration under the [address] [place] [address], [address] [place] [address] authority and the [employer] and [employer] Act, this Virtual Visit was conducted with patient's (and/or legal guardian's) consent, to reduce the risk of exposure to COVID-19 and provide necessary medical care. Services were provided through a video synchronous discussion virtually to substitute for in-person encounter. --Luna Henry MD on 6/11/2020 at 12:44 PM    An electronic signature was used to authenticate this note.

## 2020-07-18 LAB
A-G RATIO,AGRAT: 1.9 RATIO (ref 1.1–2.6)
ALBUMIN SERPL-MCNC: 4.5 G/DL (ref 3.5–5)
ALP SERPL-CCNC: 55 U/L (ref 25–115)
ALT SERPL-CCNC: 17 U/L (ref 5–40)
ANION GAP SERPL CALC-SCNC: 16 MMOL/L (ref 3–15)
AST SERPL W P-5'-P-CCNC: 15 U/L (ref 10–37)
BILIRUB SERPL-MCNC: 0.4 MG/DL (ref 0.2–1.2)
BUN SERPL-MCNC: 9 MG/DL (ref 6–22)
CALCIUM SERPL-MCNC: 9 MG/DL (ref 8.4–10.5)
CHLORIDE SERPL-SCNC: 101 MMOL/L (ref 98–110)
CHOLEST SERPL-MCNC: 142 MG/DL (ref 110–200)
CO2 SERPL-SCNC: 21 MMOL/L (ref 20–32)
CREAT SERPL-MCNC: 0.9 MG/DL (ref 0.5–1.2)
CREATININE, URINE: 250 MG/DL
ERYTHROCYTE [DISTWIDTH] IN BLOOD BY AUTOMATED COUNT: 15.7 % (ref 10–15.5)
GFRAA, 66117: >60
GFRNA, 66118: >60
GLOBULIN,GLOB: 2.4 G/DL (ref 2–4)
GLUCOSE SERPL-MCNC: 134 MG/DL (ref 70–99)
HCT VFR BLD AUTO: 41.3 % (ref 35.1–46.5)
HDLC SERPL-MCNC: 25 MG/DL
HDLC SERPL-MCNC: 5.7 MG/DL (ref 0–5)
HGB BLD-MCNC: 12.3 G/DL (ref 11.7–15.5)
LDL/HDL RATIO,LDHD: 4
LDLC SERPL CALC-MCNC: 100 MG/DL (ref 50–99)
MCH RBC QN AUTO: 27 PG (ref 26–34)
MCHC RBC AUTO-ENTMCNC: 30 G/DL (ref 31–36)
MCV RBC AUTO: 90 FL (ref 81–99)
MICROALB/CREAT RATIO, 140286: 8 (ref 0–30)
MICROALBUMIN,URINE RANDOM 140054: 20 MG/L (ref 0.1–17)
NON-HDL CHOLESTEROL, 011976: 117 MG/DL
PLATELET # BLD AUTO: 379 K/UL (ref 140–440)
PMV BLD AUTO: 11 FL (ref 9–13)
POTASSIUM SERPL-SCNC: 4.4 MMOL/L (ref 3.5–5.5)
PROT SERPL-MCNC: 6.9 G/DL (ref 6.4–8.3)
RBC # BLD AUTO: 4.57 M/UL (ref 3.8–5.2)
SODIUM SERPL-SCNC: 138 MMOL/L (ref 133–145)
TRIGL SERPL-MCNC: 83 MG/DL (ref 40–149)
VLDLC SERPL CALC-MCNC: 17 MG/DL (ref 8–30)
WBC # BLD AUTO: 6.8 K/UL (ref 4–11)

## 2020-08-26 ENCOUNTER — VIRTUAL VISIT (OUTPATIENT)
Dept: FAMILY MEDICINE CLINIC | Age: 30
End: 2020-08-26

## 2020-08-26 DIAGNOSIS — K21.9 GASTROESOPHAGEAL REFLUX DISEASE, ESOPHAGITIS PRESENCE NOT SPECIFIED: Primary | ICD-10-CM

## 2020-08-26 DIAGNOSIS — E78.5 DYSLIPIDEMIA (HIGH LDL; LOW HDL): ICD-10-CM

## 2020-08-26 DIAGNOSIS — R80.9 MICROALBUMINURIA: ICD-10-CM

## 2020-08-26 RX ORDER — CIMETIDINE 800 MG
800 TABLET ORAL 2 TIMES DAILY
Qty: 60 TAB | Refills: 1 | Status: SHIPPED | OUTPATIENT
Start: 2020-08-26 | End: 2021-03-16 | Stop reason: SDUPTHER

## 2020-08-26 NOTE — PROGRESS NOTES
1. Have you been to the ER, urgent care clinic since your last visit? Hospitalized since your last visit? No    2. Have you seen or consulted any other health care providers outside of the [address] [place] [address] since your last visit? Include any pap smears or colon screening.  No

## 2020-08-26 NOTE — PROGRESS NOTES
Ke[facility]    CC: Follow-up for chronic disease management    HPI:     Brielle Neal, who was evaluated through a synchronous (real-time) audio-video encounter, and/or her healthcare decision maker, is aware that it is a billable service, with coverage as determined by her insurance carrier. She provided verbal consent to proceed: Yes, and patient identification was verified. It was conducted pursuant to the emergency declaration under the [address] [place] [address] and the [employer] Act. A caregiver was present when appropriate. Ability to conduct physical exam was limited. I was at home. The patient was at home.       GERD:  -Taking famotidine as prescribed  -Denies any side effects or issues with the medication  -Reports symptoms are inadequate controlled with the medication  -Diet is unchanged since last visit  -Not following any regular exercise regimen       Dyslipidemia:  -Got requested lab work  -On no statin therapy  -Diet is unchanged since visit  Not following any regular exercise regimen-       Microalbuminuria:  -Got requested lab work  -Taking lisinopril as prescribed  -Denies any side effects or issues with the medication  -Reports having no urinary issues/symptoms      ROS: Positive items marked in RED  CON: fever, chills  Cardiovascular: palpitations, CP  Resp: SOB, cough  GI: nausea, vomiting, diarrhea  : dysuria, hematuria      Past Medical History:   Diagnosis Date    Carpal tunnel syndrome, bilateral     GERD (gastroesophageal reflux disease)     Hypertension     Microalbuminuria        Past Surgical History:   Procedure Laterality Date    HX HEENT      left eye surgery for lazy eye       Family History   Problem Relation Age of Onset    Diabetes Father        Social History     Tobacco Use    Smoking status: Never Smoker    Smokeless tobacco: Never Used   Substance Use Topics    Alcohol use: No    Drug use: No       No Known Allergies      Current Outpatient Medications:     amLODIPine (NORVASC) 10 mg tablet, TAKE 1 TABLET BY MOUTH DAILY, Disp: 90 Tab, Rfl: 0    famotidine (PEPCID) 20 mg tablet, Take 1 Tab by mouth two (2) times a day., Disp: 60 Tab, Rfl: 1    lisinopril-hydroCHLOROthiazide (PRINZIDE, ZESTORETIC) 20-12.5 mg per tablet, TAKE 1 TABLET BY MOUTH DAILY, Disp: 90 Tab, Rfl: 1    Physical Exam:      General: obese habitus, NAD, conversant  Eyes: sclera clear bilaterally, no discharge noted, eyelids normal in appearance, EOMI  HENT: NCAT  Neck: no masses visualized, trachea appears to be midline  Lungs: normal respiratory effort and rate, No visualized signs of difficulty breathing or respiratory distress  MS: normal AROM of neck noted  Skin: no significant exanthematous lesions or discoloration noted on neck/facial skin    Psych: alert and oriented to person, place and situation, normal affect  Neuro: speech normal, moving all upper extremities, no gaze palsy, no facial asymmetry (Cranial nerve 7 motor function) (limited exam due to video visit)    Results for Nyla Brown (MRN [id number]):   Ref.  Range 7/17/2020 09:45   WBC Latest Ref Range: 4.0 - 11.0 K/uL 6.8   RBC Latest Ref Range: 3.80 - 5.20 M/uL 4.57   HGB Latest Ref Range: 11.7 - 15.5 g/dL 12.3   HCT Latest Ref Range: 35.1 - 46.5 % 41.3   MCV Latest Ref Range: 81 - 99 fL 90   MCH Latest Ref Range: 26 - 34 pg 27   MCHC Latest Ref Range: 31 - 36 g/dL 30 (L)   RDW Latest Ref Range: 10.0 - 15.5 % 15.7 (H)   PLATELET Latest Ref Range: 140 - 440 K/uL 379   MPV Latest Ref Range: 9.0 - 13.0 fL 11.0   Sodium Latest Ref Range: 133 - 145 mmol/L 138   Potassium Latest Ref Range: 3.5 - 5.5 mmol/L 4.4   Chloride Latest Ref Range: 98 - 110 mmol/L 101   CO2 Latest Ref Range: 20 - 32 mmol/L 21   Anion gap Latest Ref Range: 3.0 - 15.0 mmol/L 16.0 (H)   Glucose Latest Ref Range: 70 - 99 mg/dL 134 (H)   BUN Latest Ref Range: 6 - 22 mg/dL 9   Creatinine Latest Ref Range: 0.5 - 1.2 mg/dL 0.9   Calcium Latest Ref Range: 8.4 - 10.5 mg/dL 9.0   Bilirubin, total Latest Ref Range: 0.2 - 1.2 mg/dL 0.4   Protein, total Latest Ref Range: 6.4 - 8.3 g/dL 6.9   Albumin Latest Ref Range: 3.5 - 5.0 g/dL 4.5   Globulin Latest Ref Range: 2.0 - 4.0 g/dL 2.4   A-G Ratio Latest Ref Range: 1.1 - 2.6 ratio 1.9   ALT Latest Ref Range: 5 - 40 U/L 17   AST Latest Ref Range: 10 - 37 U/L 15   Alk. phosphatase Latest Ref Range: 25 - 115 U/L 55   Triglyceride Latest Ref Range: 40 - 149 mg/dL 83   Cholesterol, total Latest Ref Range: 110 - 200 mg/dL 142   HDL Cholesterol Latest Ref Range: >=40 mg/dL 25 (L)   CHOLESTEROL/HDL Latest Ref Range: 0.0 - 5.0  5.7   Non-HDL Cholesterol Latest Ref Range: <130 mg/dL 117   VLDL, calculated Latest Ref Range: 8 - 30 mg/dL 17   LDL, calculated Latest Ref Range: 50 - 99 mg/dL 100 (H)   LDL/HDL Ratio Unknown 4.0   Creatinine, urine Latest Units: mg/dL 250   Microalbumin, urine Latest Ref Range: 0.1 - 17.0 mg/L 20.0 (H)   Microalbumin/Creat. Ratio Latest Ref Range: 0.0 - 30.0  8.0       Assessment/Plan     GERD, inadequately controlled:  -Discontinue prior famotidine regimen  -Started on cimetidine regimen  -Follow-up in 1 month      Dyslipidemia:  -Counseled low HDL and elevated LDL  -Advised on recommendations for management with lifestyle changes  -Follow-up in 1 month      Microalbuminuria, resolved:  -Likely 2/2 HTN  -Will continue current lisinopril regimen  -Follow-up in 1 month      Javier Best is a 31 y.o. female being evaluated by a Virtual Visit (video visit) encounter to address concerns as mentioned above. A caregiver was present when appropriate. Due to this being a TeleHealth encounter (During XFV-47 public health emergency), evaluation of the following organ systems was limited: Vitals/Constitutional/EENT/Resp/CV/GI//MS/Neuro/Skin/Heme-Lymph-Imm.   Pursuant to the emergency declaration under the [employer] and the [employer] Act, [facility] waiver authority and the Coronavirus Preparedness and [employer] Act, this Virtual Visit was conducted with patient's (and/or legal guardian's) consent, to reduce the risk of exposure to COVID-19 and provide necessary medical care. Services were provided through a video synchronous discussion virtually to substitute for in-person encounter. --Windy Gonzalez MD on 8/26/2020 at 2:50 PM    An electronic signature was used to authenticate this note.

## 2021-03-16 DIAGNOSIS — K21.9 GASTROESOPHAGEAL REFLUX DISEASE: ICD-10-CM

## 2021-03-16 RX ORDER — CIMETIDINE 800 MG
800 TABLET ORAL 2 TIMES DAILY
Qty: 60 TAB | Refills: 1 | Status: SHIPPED | OUTPATIENT
Start: 2021-03-16 | End: 2021-08-09 | Stop reason: SDUPTHER

## 2021-04-08 ENCOUNTER — VIRTUAL VISIT (OUTPATIENT)
Dept: FAMILY MEDICINE CLINIC | Age: 31
End: 2021-04-08

## 2021-04-08 DIAGNOSIS — I10 ESSENTIAL HYPERTENSION WITH GOAL BLOOD PRESSURE LESS THAN 140/90: Primary | ICD-10-CM

## 2021-04-08 DIAGNOSIS — K21.9 GASTROESOPHAGEAL REFLUX DISEASE, UNSPECIFIED WHETHER ESOPHAGITIS PRESENT: ICD-10-CM

## 2021-04-08 DIAGNOSIS — G47.33 OSA (OBSTRUCTIVE SLEEP APNEA): ICD-10-CM

## 2021-04-08 DIAGNOSIS — Z76.89 ENCOUNTER TO ESTABLISH CARE: ICD-10-CM

## 2021-04-08 RX ORDER — LISINOPRIL 10 MG/1
10 TABLET ORAL DAILY
Qty: 30 TAB | Refills: 0 | Status: SHIPPED | OUTPATIENT
Start: 2021-04-08 | End: 2021-08-10

## 2021-04-08 NOTE — PROGRESS NOTES
1. Have you been to the ER, urgent care clinic since your last visit? Hospitalized since your last visit? No    2. Have you seen or consulted any other health care providers outside of the [address] [place] [address] since your last visit? Include any pap smears or colon screening.  No      Health Maintenance Due   Topic Date Due    Hepatitis C Screening  Never done

## 2021-04-08 NOTE — PROGRESS NOTES
[facility]               Rylan Arana               [phone]      Leodan Pendleton is a 31 y.o. female who was seen by synchronous (real-time) audio-video technology on 4/8/2021. Consent: Leodan Pendleton, who was seen by synchronous (real-time) audio-video technology, and/or her healthcare decision maker, is aware that this patient-initiated, Telehealth encounter on 4/8/2021 is a billable service, with coverage as determined by her insurance carrier. She is aware that she may receive a bill and has provided verbal consent to proceed: Yes. Assessment & Plan:   Diagnoses and all orders for this visit:    1. Essential hypertension with goal blood pressure less than 140/90  -     lisinopriL (PRINIVIL, ZESTRIL) 10 mg tablet; Take 1 Tab by mouth daily. 2. Gastroesophageal reflux disease, unspecified whether esophagitis present    3. Encounter to establish care    4. AVIVA (obstructive sleep apnea)  -     REFERRAL TO SLEEP STUDIES      Follow-up and Dispositions    · Return in about 2 weeks (around 4/22/2021) for Lab, NV, b/pcheck, AND, 3month, HTN, 15 min, VV.             712  Subjective:     Health Maintenance Due   Topic Date Due    Hepatitis C Screening  Never done             Leodan Pendleton is a 31 y.o. female who was seen for   Establish Care (former Dr. Amparo Lea patient)    Hypertension:    Patient reports taking medications as instructed. no, takes about every 3 days, does not like to take because she urinates too much and interferes with her work   Medication side effects noted. yes, urinates a lot, interferes with her work  Headache upon wakening. no   Home BP monitoring in range of last checked 1 week ago 236'E systolic. Do you experience chest pain/pressure or SOB with exertion? no  Maintain a low salt diet? Other, sometimes  Key CAD CHF Meds             lisinopriL (PRINIVIL, ZESTRIL) 10 mg tablet (Taking) Take 1 Tab by mouth daily.     amLODIPine (NORVASC) 10 mg tablet (Taking) TAKE 1 TABLET BY MOUTH DAILY        GERD  Tried on pepcid: failed  chaged to tagament at last visit with Dr. Yeny Doran  Works well but she thinks it giver her headaches  She takes 1/2 tablet, she feels like taking a whole one makes the acid reflux worse  Endorses difficulty swallowing, has appt next week with GI, she persued this on her own, she does not remember tthe name of the group    AVIVA  States she snores very loud and has woken up a couple of times coughing and choking, gasping        Prior to Admission medications    Medication Sig Start Date End Date Taking? Authorizing Provider   lisinopriL (PRINIVIL, ZESTRIL) 10 mg tablet Take 1 Tab by mouth daily. 4/8/21  Yes Marilyn García NP   cimetidine (TAGAMET) 800 mg tab Take 1 Tab by mouth two (2) times a day. 3/16/21  Yes Marilyn García NP   amLODIPine (NORVASC) 10 mg tablet TAKE 1 TABLET BY MOUTH DAILY 7/27/20  Yes Susan Powers MD   lisinopril-hydroCHLOROthiazide (Louellen Poor) 20-12.5 mg per tablet TAKE 1 TABLET BY MOUTH DAILY 3/1/20 4/8/21  Susan Powers MD     No Known Allergies    Patient Active Problem List   Diagnosis Code    Essential hypertension with goal blood pressure less than 140/90 I10    Microalbuminuria R80.9    GERD (gastroesophageal reflux disease) K21.9    Severe obesity (BMI 35.0-39. 9) E66.01     Past Surgical History:   Procedure Laterality Date    HX HEENT      left eye surgery for lazy eye     Family History   Problem Relation Age of Onset    Diabetes Father      Social History     Tobacco Use    Smoking status: Never Smoker    Smokeless tobacco: Never Used   Substance Use Topics    Alcohol use: No       ROS      Objective: There were no vitals taken for this visit.    General: alert, cooperative, no distress   Mental  status: normal mood, behavior, speech, dress, motor activity, and thought processes, able to follow commands   HENT: NCAT   Neck: no visualized mass   Resp: no respiratory distress   Neuro: no gross deficits   Skin: no discoloration or lesions of concern on visible areas   Psychiatric: normal affect, consistent with stated mood, no evidence of hallucinations     Additional exam findings: We discussed the expected course, resolution and complications of the diagnosis(es) in detail. Medication risks, benefits, costs, interactions, and alternatives were discussed as indicated. I advised her to contact the office if her condition worsens, changes or fails to improve as anticipated. She expressed understanding with the diagnosis(es) and plan. Jer Butt is a 31 y.o. female who was evaluated by a video visit encounter for concerns as above. Patient identification was verified prior to start of the visit. A caregiver was present when appropriate. Due to this being a TeleHealth encounter (During [place] [address] public health emergency), evaluation of the following organ systems was limited: Vitals/Constitutional/EENT/Resp/CV/GI//MS/Neuro/Skin/Heme-Lymph-Imm. Pursuant to the emergency declaration under the [facility] waiver authority and the [employer] and [employer] Act, this Virtual  Visit was conducted, with patient's (and/or legal guardian's) consent, to reduce the patient's risk of exposure to COVID-19 and provide necessary medical care. Services were provided through a video synchronous discussion virtually to substitute for in-person clinic visit. Patient and provider were located at their individual homes. An After Visit Summary was printed and given to the patient. All diagnosis have been discussed with the patient and all of the patient's questions have been answered. Follow-up and Dispositions    · Return in about 2 weeks (around 4/22/2021) for Lab, NV, b/pcheck, AND, 3month, HTN, 15 min, VV.          Keagan Montague, [facility]  [address] [place] [address] [facility]   Rylan Arana

## 2021-05-07 ENCOUNTER — CLINICAL SUPPORT (OUTPATIENT)
Dept: FAMILY MEDICINE CLINIC | Age: 31
End: 2021-05-07

## 2021-05-07 ENCOUNTER — LAB ONLY (OUTPATIENT)
Dept: FAMILY MEDICINE CLINIC | Age: 31
End: 2021-05-07

## 2021-05-07 VITALS
SYSTOLIC BLOOD PRESSURE: 116 MMHG | BODY MASS INDEX: 39.69 KG/M2 | HEART RATE: 81 BPM | WEIGHT: 224 LBS | DIASTOLIC BLOOD PRESSURE: 71 MMHG | RESPIRATION RATE: 14 BRPM | OXYGEN SATURATION: 100 % | TEMPERATURE: 98.2 F | HEIGHT: 63 IN

## 2021-05-07 DIAGNOSIS — Z11.59 NEED FOR HEPATITIS C SCREENING TEST: ICD-10-CM

## 2021-05-07 DIAGNOSIS — I10 ESSENTIAL HYPERTENSION WITH GOAL BLOOD PRESSURE LESS THAN 140/90: Primary | ICD-10-CM

## 2021-05-07 DIAGNOSIS — I10 ESSENTIAL HYPERTENSION: Primary | ICD-10-CM

## 2021-05-07 DIAGNOSIS — G47.33 OSA (OBSTRUCTIVE SLEEP APNEA): ICD-10-CM

## 2021-05-07 NOTE — PROGRESS NOTES
Chief Complaint   Patient presents with    Blood Pressure Check     Patient is here for a Bp check Per Meng Chow. There is no changes. Follow up with Meng Chow.  July 9, 21

## 2021-05-08 LAB
A-G RATIO,AGRAT: 1.4 RATIO (ref 1.1–2.6)
ALBUMIN SERPL-MCNC: 4.3 G/DL (ref 3.5–5)
ALP SERPL-CCNC: 61 U/L (ref 25–115)
ALT SERPL-CCNC: 18 U/L (ref 5–40)
ANION GAP SERPL CALC-SCNC: 13 MMOL/L (ref 3–15)
AST SERPL W P-5'-P-CCNC: 12 U/L (ref 10–37)
BILIRUB SERPL-MCNC: 0.3 MG/DL (ref 0.2–1.2)
BUN SERPL-MCNC: 9 MG/DL (ref 6–22)
CALCIUM SERPL-MCNC: 9.1 MG/DL (ref 8.4–10.5)
CHLORIDE SERPL-SCNC: 101 MMOL/L (ref 98–110)
CO2 SERPL-SCNC: 25 MMOL/L (ref 20–32)
CREAT SERPL-MCNC: 0.8 MG/DL (ref 0.5–1.2)
ERYTHROCYTE [DISTWIDTH] IN BLOOD BY AUTOMATED COUNT: 14.9 % (ref 10–15.5)
GFRAA, 66117: >60
GFRNA, 66118: >60
GLOBULIN,GLOB: 3 G/DL (ref 2–4)
GLUCOSE SERPL-MCNC: 93 MG/DL (ref 70–99)
HCT VFR BLD AUTO: 41.5 % (ref 35.1–46.5)
HCV AB SER IA-ACNC: NORMAL
HGB BLD-MCNC: 12.8 G/DL (ref 11.7–15.5)
MCH RBC QN AUTO: 28 PG (ref 26–34)
MCHC RBC AUTO-ENTMCNC: 31 G/DL (ref 31–36)
MCV RBC AUTO: 89 FL (ref 81–99)
PLATELET # BLD AUTO: 420 K/UL (ref 140–440)
PMV BLD AUTO: 10.2 FL (ref 9–13)
POTASSIUM SERPL-SCNC: 4.5 MMOL/L (ref 3.5–5.5)
PROT SERPL-MCNC: 7.3 G/DL (ref 6.4–8.3)
RBC # BLD AUTO: 4.65 M/UL (ref 3.8–5.2)
SODIUM SERPL-SCNC: 139 MMOL/L (ref 133–145)
WBC # BLD AUTO: 6.3 K/UL (ref 4–11)

## 2021-06-17 PROBLEM — R13.19 ESOPHAGEAL DYSPHAGIA: Status: ACTIVE | Noted: 2021-06-17

## 2021-07-09 ENCOUNTER — VIRTUAL VISIT (OUTPATIENT)
Dept: FAMILY MEDICINE CLINIC | Age: 31
End: 2021-07-09
Payer: MEDICAID

## 2021-07-09 DIAGNOSIS — K21.9 GASTROESOPHAGEAL REFLUX DISEASE, UNSPECIFIED WHETHER ESOPHAGITIS PRESENT: ICD-10-CM

## 2021-07-09 DIAGNOSIS — I10 ESSENTIAL HYPERTENSION WITH GOAL BLOOD PRESSURE LESS THAN 140/90: Primary | ICD-10-CM

## 2021-07-09 DIAGNOSIS — G47.33 OSA (OBSTRUCTIVE SLEEP APNEA): ICD-10-CM

## 2021-07-09 PROCEDURE — 99214 OFFICE O/P EST MOD 30 MIN: CPT | Performed by: NURSE PRACTITIONER

## 2021-07-09 NOTE — PROGRESS NOTES
[facility]               Rylan Arana [address]               [phone]      Rosalba Noonan is a 27 y.o. female who was seen by synchronous (real-time) audio-video technology on 7/9/2021. Consent: Rosalba Noonan, who was seen by synchronous (real-time) audio-video technology, and/or her healthcare decision maker, is aware that this patient-initiated, Telehealth encounter on 7/9/2021 is a billable service, with coverage as determined by her insurance carrier. She is aware that she may receive a bill and has provided verbal consent to proceed: Yes. Assessment & Plan:   Diagnoses and all orders for this visit:    1. Essential hypertension with goal blood pressure less than 165/30  -     METABOLIC PANEL, COMPREHENSIVE; Future  Endorses medication compliance, Have asked him/her to come in for follow-up labs and Blood pressure stable per home checks, continue same medications   2. Gastroesophageal reflux disease, unspecified whether esophagitis present  had esophogeal dilitation, states she will need repeat procedure in a couple of months, endorses significant relief in reflux symptoms   3. AVIVA (obstructive sleep apnea)  she did not follow up with sleep medicine, provided name and number of office she was referred to     Follow-up and Dispositions    · Return in about 4 months (around 11/9/2021) for HTN, office only, 15 min, AND, labs prior. 712  Subjective:     Health Maintenance Due   Topic Date Due    COVID-19 Vaccine (1) Never done    DTaP/Tdap/Td series (1 - Tdap) Never done             Rosalba Noonan is a 27 y.o. female who was seen for   Follow Up Chronic Condition and Hypertension    Hypertension:   Patient reports taking medications as instructed. yes   Medication side effects noted. no  Headache upon wakening. no   Home BP monitoring in range of 362/39'R systolic. Do you experience chest pain/pressure or SOB with exertion? no  Maintain a low salt diet?  yes  Key CAD CHF Meds             lisinopriL (PRINIVIL, ZESTRIL) 10 mg tablet (Taking) Take 1 Tab by mouth daily. amLODIPine (NORVASC) 10 mg tablet (Taking) TAKE 1 TABLET BY MOUTH DAILY        GERD  Went to GI  Had EGD completed: states she had a narrowing of her esophogus and had a dilation, states she was told it will need to be repeated in couple of months  States this has helped her a lot  Currently taking tagamet 800mg once a day    Sleep study referral  She did not follow up on the referral        Prior to Admission medications    Medication Sig Start Date End Date Taking? Authorizing Provider   lisinopriL (PRINIVIL, ZESTRIL) 10 mg tablet Take 1 Tab by mouth daily. 4/8/21  Yes Gilmer Gowers, NP   cimetidine (TAGAMET) 800 mg tab Take 1 Tab by mouth two (2) times a day. 3/16/21  Yes Gilmer Gowers, NP   amLODIPine (NORVASC) 10 mg tablet TAKE 1 TABLET BY MOUTH DAILY 7/27/20  Yes Emerita Nguyen MD   ibuprofen (MOTRIN) 800 mg tablet Take 1 Tab by mouth every eight (8) hours as needed for Pain (and inflammation. ). Patient not taking: Reported on 7/9/2021 5/13/21   Madalyn Valencia PA-C     No Known Allergies    Patient Active Problem List   Diagnosis Code    Essential hypertension with goal blood pressure less than 140/90 I10    Microalbuminuria R80.9    GERD (gastroesophageal reflux disease) K21.9    Severe obesity (BMI 35.0-39. 9) E66.01    Esophageal dysphagia R13.10     Past Surgical History:   Procedure Laterality Date    HX HEENT      left eye surgery for lazy eye     Family History   Problem Relation Age of Onset    Diabetes Father      Social History     Tobacco Use    Smoking status: Never Smoker    Smokeless tobacco: Never Used   Substance Use Topics    Alcohol use: No       ROS    As stated in HPI, otherwise all others negative. Objective: There were no vitals taken for this visit.    General: alert, cooperative, no distress   Mental  status: normal mood, behavior, speech, dress, motor activity, and thought processes, able to follow commands   HENT: NCAT   Neck: no visualized mass   Resp: no respiratory distress   Neuro: no gross deficits   Skin: no discoloration or lesions of concern on visible areas   Psychiatric: normal affect, consistent with stated mood, no evidence of hallucinations     Additional exam findings: We discussed the expected course, resolution and complications of the diagnosis(es) in detail. Medication risks, benefits, costs, interactions, and alternatives were discussed as indicated. I advised her to contact the office if her condition worsens, changes or fails to improve as anticipated. She expressed understanding with the diagnosis(es) and plan. Jillian Méndez is a 27 y.o. female who was evaluated by a video visit encounter for concerns as above. Patient identification was verified prior to start of the visit. A caregiver was present when appropriate. Due to this being a TeleHealth encounter (During XVNLK-68 public health emergency), evaluation of the following organ systems was limited: Vitals/Constitutional/EENT/Resp/CV/GI//MS/Neuro/Skin/Heme-Lymph-Imm. Pursuant to the emergency declaration under the [facility] waiver authority and the [employer] and [employer] Act, this Virtual  Visit was conducted, with patient's (and/or legal guardian's) consent, to reduce the patient's risk of exposure to COVID-19 and provide necessary medical care. Services were provided through a video synchronous discussion virtually to substitute for in-person clinic visit. Patient and provider were located at their individual homes. An After Visit Summary was printed and given to the patient. All diagnosis have been discussed with the patient and all of the patient's questions have been answered.      Follow-up and Dispositions    · Return in about 4 months (around 11/9/2021) for HTN, office only, 15 min, AND, labs prior. [place] [id number] [address] [place].   Rylan Arana 460

## 2021-07-09 NOTE — PROGRESS NOTES
Please use this number [phone]    1. Have you been to the ER, urgent care clinic since your last visit? Hospitalized since your last visit? Yes for tooth    2. Have you seen or consulted any other health care providers outside of the [address] [place] [address] since your last visit? Include any pap smears or colon screening.  Yes Gastroenterologist       Health Maintenance Due   Topic Date Due    COVID-19 Vaccine (1) Never done    DTaP/Tdap/Td series (1 - Tdap) Never done

## 2021-08-09 DIAGNOSIS — I10 ESSENTIAL HYPERTENSION: ICD-10-CM

## 2021-08-09 DIAGNOSIS — K21.9 GASTROESOPHAGEAL REFLUX DISEASE: ICD-10-CM

## 2021-08-10 DIAGNOSIS — K21.9 GASTROESOPHAGEAL REFLUX DISEASE: ICD-10-CM

## 2021-08-10 DIAGNOSIS — K21.9 GASTROESOPHAGEAL REFLUX DISEASE, UNSPECIFIED WHETHER ESOPHAGITIS PRESENT: Primary | ICD-10-CM

## 2021-08-10 DIAGNOSIS — I10 ESSENTIAL HYPERTENSION WITH GOAL BLOOD PRESSURE LESS THAN 140/90: ICD-10-CM

## 2021-08-10 RX ORDER — AMLODIPINE BESYLATE 10 MG/1
TABLET ORAL
Qty: 90 TABLET | Refills: 0 | Status: SHIPPED | OUTPATIENT
Start: 2021-08-10 | End: 2022-11-02 | Stop reason: SDUPTHER

## 2021-08-10 RX ORDER — CIMETIDINE 800 MG
800 TABLET ORAL 2 TIMES DAILY
Qty: 60 TABLET | Refills: 1 | Status: SHIPPED | OUTPATIENT
Start: 2021-08-10 | End: 2021-08-11

## 2021-08-11 RX ORDER — CIMETIDINE 800 MG
TABLET ORAL
Qty: 180 TABLET | Refills: 0 | Status: SHIPPED | OUTPATIENT
Start: 2021-08-11 | End: 2021-12-07

## 2021-08-11 RX ORDER — LISINOPRIL 10 MG/1
TABLET ORAL
Qty: 90 TABLET | Refills: 0 | Status: SHIPPED | OUTPATIENT
Start: 2021-08-11 | End: 2022-11-02 | Stop reason: SDUPTHER

## 2021-09-22 ENCOUNTER — PATIENT MESSAGE (OUTPATIENT)
Dept: FAMILY MEDICINE CLINIC | Age: 31
End: 2021-09-22

## 2021-09-22 NOTE — TELEPHONE ENCOUNTER
From: Skippy Lines  To: [facility]  Sent: 9/22/2021 10:04 AM EDT  Subject: Prescription Question    Hi I wanted to know if its possible to be prescribed vitamins. In the past weeks I appear to be very tired throughout the day and it is starting to affect my daily activities including work. I also I have a issue with falling asleep at night.

## 2021-12-03 DIAGNOSIS — K21.9 GASTROESOPHAGEAL REFLUX DISEASE, UNSPECIFIED WHETHER ESOPHAGITIS PRESENT: ICD-10-CM

## 2021-12-07 RX ORDER — CIMETIDINE 800 MG
TABLET ORAL
Qty: 180 TABLET | Refills: 0 | Status: SHIPPED | OUTPATIENT
Start: 2021-12-07 | End: 2022-11-02 | Stop reason: SDUPTHER

## 2022-03-18 PROBLEM — K21.9 GERD (GASTROESOPHAGEAL REFLUX DISEASE): Status: ACTIVE | Noted: 2018-06-06

## 2022-03-19 PROBLEM — R80.9 MICROALBUMINURIA: Status: ACTIVE | Noted: 2018-06-06

## 2022-03-19 PROBLEM — R13.19 ESOPHAGEAL DYSPHAGIA: Status: ACTIVE | Noted: 2021-06-17

## 2022-03-19 PROBLEM — I10 ESSENTIAL HYPERTENSION WITH GOAL BLOOD PRESSURE LESS THAN 140/90: Status: ACTIVE | Noted: 2018-06-06

## 2022-11-02 ENCOUNTER — OFFICE VISIT (OUTPATIENT)
Dept: FAMILY MEDICINE CLINIC | Age: 32
End: 2022-11-02
Payer: MEDICAID

## 2022-11-02 VITALS
TEMPERATURE: 98.5 F | OXYGEN SATURATION: 98 % | HEART RATE: 85 BPM | BODY MASS INDEX: 40.78 KG/M2 | DIASTOLIC BLOOD PRESSURE: 103 MMHG | WEIGHT: 221.6 LBS | HEIGHT: 62 IN | SYSTOLIC BLOOD PRESSURE: 157 MMHG | RESPIRATION RATE: 20 BRPM

## 2022-11-02 DIAGNOSIS — I10 ESSENTIAL HYPERTENSION WITH GOAL BLOOD PRESSURE LESS THAN 140/90: ICD-10-CM

## 2022-11-02 DIAGNOSIS — I10 ESSENTIAL HYPERTENSION: Primary | ICD-10-CM

## 2022-11-02 DIAGNOSIS — N93.9 ABNORMAL UTERINE BLEEDING (AUB): ICD-10-CM

## 2022-11-02 DIAGNOSIS — K21.9 GASTROESOPHAGEAL REFLUX DISEASE, UNSPECIFIED WHETHER ESOPHAGITIS PRESENT: ICD-10-CM

## 2022-11-02 PROCEDURE — 3074F SYST BP LT 130 MM HG: CPT | Performed by: NURSE PRACTITIONER

## 2022-11-02 PROCEDURE — 3078F DIAST BP <80 MM HG: CPT | Performed by: NURSE PRACTITIONER

## 2022-11-02 PROCEDURE — 99214 OFFICE O/P EST MOD 30 MIN: CPT | Performed by: NURSE PRACTITIONER

## 2022-11-02 RX ORDER — AMLODIPINE BESYLATE 10 MG/1
TABLET ORAL
Qty: 30 TABLET | Refills: 0 | Status: SHIPPED | OUTPATIENT
Start: 2022-11-02

## 2022-11-02 RX ORDER — CIMETIDINE 800 MG
800 TABLET ORAL 2 TIMES DAILY
Qty: 180 TABLET | Refills: 0 | Status: SHIPPED | OUTPATIENT
Start: 2022-11-02

## 2022-11-02 RX ORDER — LISINOPRIL 10 MG/1
10 TABLET ORAL DAILY
Qty: 30 TABLET | Refills: 0 | Status: SHIPPED | OUTPATIENT
Start: 2022-11-02

## 2022-11-02 NOTE — PROGRESS NOTES
Room 8     When asked if patient has any concerns she would like to address with ANDRY Kemp patient states yes my cycle has been really bad. Patient states I have been cramping, clotting and extremely long . Did patient bring someone? No    Did the patient have DME equipment? No     Did you take your medication today? Patient states I have been out of my medication for 2 weeks . 1. \"Have you been to the ER, urgent care clinic since your last visit? Hospitalized since your last visit? \" No    2. \"Have you seen or consulted any other health care providers outside of the [address] [place] [address] since your last visit? \" No     3. For patients aged 39-70: Has the patient had a colonoscopy / FIT/ Cologuard? NA - based on age      If the patient is female:    4. For patients aged 41-77: Has the patient had a mammogram within the past 2 years? NA - based on age or sex      11. For patients aged 21-65: Has the patient had a pap smear? No Patient states I would like schedule with RON Callahan for paps.          3 most recent PHQ Screens 11/2/2022   Little interest or pleasure in doing things Not at all   Feeling down, depressed, irritable, or hopeless Not at all   Total Score PHQ 2 0         Health Maintenance Due   Topic Date Due    COVID-19 Vaccine (1) Never done    DTaP/Tdap/Td series (1 - Tdap) Never done    Cervical cancer screen  Never done    Depression Screen  07/09/2022    Flu Vaccine (1) 08/01/2022       Learning Assessment 6/5/2018   PRIMARY LEARNER Patient   HIGHEST LEVEL OF EDUCATION - PRIMARY LEARNER  SOME COLLEGE   BARRIERS PRIMARY LEARNER NONE   CO-LEARNER CAREGIVER No   PRIMARY LANGUAGE ENGLISH   LEARNER PREFERENCE PRIMARY DEMONSTRATION   ANSWERED BY patient   RELATIONSHIP SELF

## 2022-11-02 NOTE — PROGRESS NOTES
[address] [place] [address]               [phone]      Hortencia Hester is a 34 y.o. female and presents with No chief complaint on file. Assessment/Plan:    Diagnoses and all orders for this visit:    1. Essential hypertension  -     amLODIPine (NORVASC) 10 mg tablet; TAKE 1 TABLET BY MOUTH DAILY  -     METABOLIC PANEL, BASIC; Future  Has not had her medications for about 2 weeks, refills provided, follow-up labs today, blood pressure elevated lab her return in 2 weeks when she has her medications on board for blood pressure recheck  Patient verbalized understanding and is in agreement with this plan of care    2. Gastroesophageal reflux disease, unspecified whether esophagitis present  -     cimetidine (TAGAMET) 800 mg tab; Take 1 Tablet by mouth two (2) times a day. Refill provided  3. Essential hypertension with goal blood pressure less than 140/90  -     lisinopriL (PRINIVIL, ZESTRIL) 10 mg tablet; Take 1 Tablet by mouth daily. Refill provided  4. Abnormal uterine bleeding (AUB)  States she was having heavy menstrual bleeding however she had her Nexplanon removed which could have been the source, continue to monitor and see if her menstrual bleeding slows down she is going to establish with a gynecologist for routine care    Follow-up and Dispositions    Return in about 2 weeks (around 11/16/2022) for NV, b/pcheck, AND, 3month, CPE, w/o gyn, HTN, 30 min, office only. Health Maintenance:   Health Maintenance   Topic Date Due    COVID-19 Vaccine (1) Never done    DTaP/Tdap/Td series (1 - Tdap) Never done    Cervical cancer screen  Never done    Flu Vaccine (1) 08/01/2022    Depression Screen  11/02/2023    Hepatitis C Screening  Completed    Pneumococcal 0-64 years  Aged Out        Subjective:    Labs obtained prior to visit? NO  Reviewed with patient?  Not applicable    Hypertension:  Visit Vitals  BP (!) 157/103   Pulse 85   Temp 98.5 °F (36.9 °C) (Temporal)   Resp 20   Ht 5' 2\" (1.575 m)   Wt 221 lb 9.6 oz (100.5 kg)   SpO2 98%   BMI 40.53 kg/m²      Patient reports taking medications as instructed. no, has not had medications for about 2 weeks   Medication side effects noted. no  Headache upon wakening. no   Home BP monitoring: Does not check  Do you experience chest pain/pressure or SOB with exertion? no  Maintain a low Sodium diet? yes  Key CAD CHF Meds               amLODIPine (NORVASC) 10 mg tablet (Taking) TAKE 1 TABLET BY MOUTH DAILY    lisinopriL (PRINIVIL, ZESTRIL) 10 mg tablet (Taking) Take 1 Tablet by mouth daily. BUN   Date Value Ref Range Status   11/02/2022 8 6 - 20 mg/dL Final     Creatinine   Date Value Ref Range Status   11/02/2022 0.89 0.57 - 1.00 mg/dL Final     GFR est AA   Date Value Ref Range Status   06/03/2018 >60 >60 ml/min/1.73m2 Final     Potassium   Date Value Ref Range Status   11/02/2022 4.5 3.5 - 5.2 mmol/L Final     AUB  Starting march her cycle was 15 days long  She has had a couple of cycles that lasted 28 days  She was having heavy bleeding and clots  She had a nexplanon removed about 2 weeks ago  She was told this could be the cause of her heavy menses  Plan: wait a couple of months to see if the aub changes, meanwhile she will make an appointment with gyn for routine care    ROS:     ROS  As stated in HPI, otherwise all others negative. The problem list was updated as a part of today's visit. Patient Active Problem List   Diagnosis Code    Essential hypertension with goal blood pressure less than 140/90 I10    Microalbuminuria R80.9    GERD (gastroesophageal reflux disease) K21.9    Severe obesity (BMI 35.0-39. 9) E66.01    Esophageal dysphagia R13.19       The PSH, FH were reviewed.       SH:  Social History     Tobacco Use    Smoking status: Never    Smokeless tobacco: Never   Substance Use Topics    Alcohol use: No    Drug use: No       Medications/Allergies:  No current outpatient medications on file prior to visit. No current facility-administered medications on file prior to visit. No Known Allergies    Objective:  Visit Vitals  BP (!) 157/103   Pulse 85   Temp 98.5 °F (36.9 °C) (Temporal)   Resp 20   Ht 5' 2\" (1.575 m)   Wt 221 lb 9.6 oz (100.5 kg)   SpO2 98%   BMI 40.53 kg/m²    Body mass index is 40.53 kg/m². Physical assessment  Physical Exam  Vitals and nursing note reviewed. Eyes:      Conjunctiva/sclera: Conjunctivae normal.      Pupils: Pupils are equal, round, and reactive to light. Cardiovascular:      Rate and Rhythm: Normal rate and regular rhythm. Heart sounds: Normal heart sounds. No murmur heard. No friction rub. No gallop. Pulmonary:      Effort: Pulmonary effort is normal.      Breath sounds: Normal breath sounds. Musculoskeletal:         General: Normal range of motion. Cervical back: Normal range of motion. Skin:     General: Skin is warm and dry. Neurological:      Mental Status: She is alert.          Labwork and Ancillary Studies:    CBC w/Diff  Lab Results   Component Value Date/Time    WBC 6.3 05/07/2021 08:50 AM    HGB 12.8 05/07/2021 08:50 AM    PLATELET 867 48/11/6569 08:50 AM         Basic Metabolic Profile  Lab Results   Component Value Date/Time    Sodium 140 11/02/2022 11:31 AM    Potassium 4.5 11/02/2022 11:31 AM    Chloride 102 11/02/2022 11:31 AM    CO2 24 11/02/2022 11:31 AM    Anion gap 13.0 05/07/2021 08:50 AM    Glucose 91 11/02/2022 11:31 AM    BUN 8 11/02/2022 11:31 AM    Creatinine 0.89 11/02/2022 11:31 AM    BUN/Creatinine ratio 9 11/02/2022 11:31 AM    GFR est AA >60 06/03/2018 12:10 AM    GFR est non-AA >60 06/03/2018 12:10 AM    Calcium 9.1 11/02/2022 11:31 AM        Cholesterol  Lab Results   Component Value Date/Time    Cholesterol, total 142 07/17/2020 09:45 AM    HDL Cholesterol 25 (L) 07/17/2020 09:45 AM    LDL, calculated 100 (H) 07/17/2020 09:45 AM    Triglyceride 83 07/17/2020 09:45 AM           I have discussed the diagnosis with the patient and the intended plan as seen in the above orders. The patient has received an After-Visit Summary and questions were answered concerning future plans. An After Visit Summary was printed and given to the patient. All diagnosis have been discussed with the patient and all of the patient's questions have been answered. Follow-up and Dispositions    Return in about 2 weeks (around 11/16/2022) for NV, b/pcheck, AND, 3month, CPE, w/o gyn, HTN, 30 min, office only. Susanna Turk, JACQUELINE-BC  [address] [facility]   [address] [facility] [address]  [address]

## 2022-11-03 LAB
BUN SERPL-MCNC: 8 MG/DL (ref 6–20)
BUN/CREAT SERPL: 9 (ref 9–23)
CALCIUM SERPL-MCNC: 9.1 MG/DL (ref 8.7–10.2)
CHLORIDE SERPL-SCNC: 102 MMOL/L (ref 96–106)
CO2 SERPL-SCNC: 24 MMOL/L (ref 20–29)
CREAT SERPL-MCNC: 0.89 MG/DL (ref 0.57–1)
EGFR: 88 ML/MIN/1.73
GLUCOSE SERPL-MCNC: 91 MG/DL (ref 70–99)
POTASSIUM SERPL-SCNC: 4.5 MMOL/L (ref 3.5–5.2)
SODIUM SERPL-SCNC: 140 MMOL/L (ref 134–144)

## 2023-02-02 ENCOUNTER — OFFICE VISIT (OUTPATIENT)
Dept: FAMILY MEDICINE CLINIC | Age: 33
End: 2023-02-02
Payer: MEDICAID

## 2023-02-02 VITALS
SYSTOLIC BLOOD PRESSURE: 130 MMHG | HEIGHT: 62 IN | HEART RATE: 79 BPM | DIASTOLIC BLOOD PRESSURE: 88 MMHG | RESPIRATION RATE: 18 BRPM | BODY MASS INDEX: 39.93 KG/M2 | WEIGHT: 217 LBS | OXYGEN SATURATION: 98 % | TEMPERATURE: 99.2 F

## 2023-02-02 DIAGNOSIS — K21.9 GASTROESOPHAGEAL REFLUX DISEASE, UNSPECIFIED WHETHER ESOPHAGITIS PRESENT: ICD-10-CM

## 2023-02-02 DIAGNOSIS — I10 ESSENTIAL HYPERTENSION WITH GOAL BLOOD PRESSURE LESS THAN 140/90: ICD-10-CM

## 2023-02-02 DIAGNOSIS — Z00.00 WELL WOMAN EXAM (NO GYNECOLOGICAL EXAM): Primary | ICD-10-CM

## 2023-02-02 PROCEDURE — 3078F DIAST BP <80 MM HG: CPT | Performed by: NURSE PRACTITIONER

## 2023-02-02 PROCEDURE — 3074F SYST BP LT 130 MM HG: CPT | Performed by: NURSE PRACTITIONER

## 2023-02-02 PROCEDURE — 99395 PREV VISIT EST AGE 18-39: CPT | Performed by: NURSE PRACTITIONER

## 2023-02-02 RX ORDER — LISINOPRIL 10 MG/1
10 TABLET ORAL DAILY
Qty: 90 TABLET | Refills: 1 | Status: SHIPPED | OUTPATIENT
Start: 2023-02-02 | End: 2023-02-02 | Stop reason: SDUPTHER

## 2023-02-02 RX ORDER — AMLODIPINE BESYLATE 10 MG/1
TABLET ORAL
Qty: 90 TABLET | Refills: 1 | Status: SHIPPED | OUTPATIENT
Start: 2023-02-02 | End: 2023-02-02 | Stop reason: SDUPTHER

## 2023-02-02 RX ORDER — LISINOPRIL 10 MG/1
10 TABLET ORAL DAILY
Qty: 90 TABLET | Refills: 1 | Status: SHIPPED | OUTPATIENT
Start: 2023-02-02

## 2023-02-02 RX ORDER — AMLODIPINE BESYLATE 10 MG/1
TABLET ORAL
Qty: 90 TABLET | Refills: 1 | Status: SHIPPED | OUTPATIENT
Start: 2023-02-02

## 2023-02-02 RX ORDER — CIMETIDINE 800 MG
800 TABLET ORAL 2 TIMES DAILY
Qty: 180 TABLET | Refills: 0 | Status: SHIPPED | OUTPATIENT
Start: 2023-02-02 | End: 2023-02-02 | Stop reason: SDUPTHER

## 2023-02-02 RX ORDER — CIMETIDINE 800 MG
800 TABLET ORAL 2 TIMES DAILY
Qty: 180 TABLET | Refills: 0 | Status: SHIPPED | OUTPATIENT
Start: 2023-02-02

## 2023-02-02 NOTE — PROGRESS NOTES
[address] [place], [place] [address] [address]               [phone]  Subjective:   34 y.o. female for Well Woman Check. Her gyne and breast care is done elsewhere by her Ob-Gyne physician. Patient Active Problem List   Diagnosis Code    Essential hypertension with goal blood pressure less than 140/90 I10    Microalbuminuria R80.9    GERD (gastroesophageal reflux disease) K21.9    Severe obesity (BMI 35.0-39. 9) E66.01    Esophageal dysphagia R13.19     Current Outpatient Medications   Medication Sig Dispense Refill    amLODIPine (NORVASC) 10 mg tablet TAKE 1 TABLET BY MOUTH DAILY 90 Tablet 1    lisinopriL (PRINIVIL, ZESTRIL) 10 mg tablet Take 1 Tablet by mouth daily. 90 Tablet 1    cimetidine (TAGAMET) 800 mg tab Take 1 Tablet by mouth two (2) times a day. 180 Tablet 0     No Known Allergies  Past Medical History:   Diagnosis Date    Carpal tunnel syndrome, bilateral     GERD (gastroesophageal reflux disease)     Hypertension     Microalbuminuria      Past Surgical History:   Procedure Laterality Date    HX HEENT      left eye surgery for lazy eye     Family History   Problem Relation Age of Onset    Diabetes Father      Social History     Tobacco Use    Smoking status: Never    Smokeless tobacco: Never   Substance Use Topics    Alcohol use: No             ROS: Feeling generally well. No TIA's or unusual headaches, no dysphagia. No prolonged cough. No dyspnea or chest pain on exertion. No abdominal pain, change in bowel habits, black or bloody stools. No urinary tract symptoms. No new or unusual musculoskeletal symptoms. Specific concerns today:   Hypertension:  Visit Vitals  /88   Pulse 79   Temp 99.2 °F (37.3 °C) (Temporal)   Resp 18   Ht 5' 2\" (1.575 m)   Wt 217 lb (98.4 kg)   SpO2 98%   BMI 39.69 kg/m²      Patient reports taking medications as instructed. Other, ran out and has not had for two days   Medication side effects noted. no  Headache upon wakening.  no   Home BP monitoring: Does not check  Do you experience chest pain/pressure or SOB with exertion? no  Maintain a low Sodium diet? yes  Key CAD CHF Meds               amLODIPine (NORVASC) 10 mg tablet (Taking) TAKE 1 TABLET BY MOUTH DAILY    lisinopriL (PRINIVIL, ZESTRIL) 10 mg tablet (Taking) Take 1 Tablet by mouth daily. BUN   Date Value Ref Range Status   11/02/2022 8 6 - 20 mg/dL Final     Creatinine   Date Value Ref Range Status   11/02/2022 0.89 0.57 - 1.00 mg/dL Final     GFR est AA   Date Value Ref Range Status   06/03/2018 >60 >60 ml/min/1.73m2 Final     Potassium   Date Value Ref Range Status   11/02/2022 4.5 3.5 - 5.2 mmol/L Final       .    Objective: The patient appears well, alert, oriented x 3, in no distress. Visit Vitals  /88   Pulse 79   Temp 99.2 °F (37.3 °C) (Temporal)   Resp 18   Ht 5' 2\" (1.575 m)   Wt 217 lb (98.4 kg)   SpO2 98%   BMI 39.69 kg/m²     ENT normal.  Neck supple. No adenopathy or thyromegaly. ANNA. Lungs are clear, good air entry, no wheezes, rhonchi or rales. S1 and S2 normal, no murmurs, regular rate and rhythm. Abdomen soft without tenderness, guarding, mass or organomegaly. Extremities show no edema, normal peripheral pulses. Neurological is normal, no focal findings. Breast and Pelvic exams are deferred. Assessment/Plan:   Well Woman  lose weight, increase physical activity, limit alcohol consumption, follow low fat diet, follow low salt diet, have labs drawn prior to ROV, call if any problems  Diagnoses and all orders for this visit:    1. Well woman exam (no gynecological exam)  Comments:  [V70.0]  Completed today, no abnormal findings  2. Gastroesophageal reflux disease, unspecified whether esophagitis present  -     cimetidine (TAGAMET) 800 mg tab; Take 1 Tablet by mouth two (2) times a day. Refill provided  3.  Essential hypertension with goal blood pressure less than 140/90  -     amLODIPine (NORVASC) 10 mg tablet; TAKE 1 TABLET BY MOUTH DAILY  - lisinopriL (PRINIVIL, ZESTRIL) 10 mg tablet; Take 1 Tablet by mouth daily.  -     METABOLIC PANEL, COMPREHENSIVE; Future  Endorses medication compliance, Refill provided, Follow up labs prior to next visit, and Blood pressure stable, continue amlodipine and lisinopril at same dose    Follow-up and Dispositions    Return in about 3 months (around 5/2/2023) for HTN, 15 min, office only, w/labs prior. [place] [id number] [address]   [address]

## 2023-02-02 NOTE — PROGRESS NOTES
Room 7     When asked if patient has any concerns she would like to address with ANDRY Kemp patient states No       Did patient bring someone? No    Did the patient have DME equipment? No     Did you take your medication today? Patient states no I have been out of my Blood pressure medication for 2 days and I took the last cimetidine last night . 1. \"Have you been to the ER, urgent care clinic since your last visit? Hospitalized since your last visit? \" No    2. \"Have you seen or consulted any other health care providers outside of the [address] [place] [address] since your last visit? \" No     3. For patients aged 39-70: Has the patient had a colonoscopy / FIT/ Cologuard? NA - based on age      If the patient is female:    4. For patients aged 41-77: Has the patient had a mammogram within the past 2 years? NA - based on age or sex      11. For patients aged 21-65: Has the patient had a pap smear?  No patient states I will schedule an appointment for pap.       3 most recent PHQ Screens 2/2/2023   Little interest or pleasure in doing things Not at all   Feeling down, depressed, irritable, or hopeless Not at all   Total Score PHQ 2 0         Health Maintenance Due   Topic Date Due    COVID-19 Vaccine (1) Never done    DTaP/Tdap/Td series (1 - Tdap) Never done    Cervical cancer screen  Never done    Flu Vaccine (1) 08/01/2022       Learning Assessment 6/5/2018   PRIMARY LEARNER Patient   HIGHEST LEVEL OF EDUCATION - PRIMARY LEARNER  SOME COLLEGE   BARRIERS PRIMARY LEARNER NONE   CO-LEARNER CAREGIVER No   PRIMARY LANGUAGE ENGLISH   LEARNER PREFERENCE PRIMARY DEMONSTRATION   ANSWERED BY patient   RELATIONSHIP SELF

## 2023-03-10 RX ORDER — CIMETIDINE 800 MG
800 TABLET ORAL 2 TIMES DAILY
Qty: 180 TABLET | Refills: 1 | Status: SHIPPED | OUTPATIENT
Start: 2023-03-10

## 2023-03-10 NOTE — TELEPHONE ENCOUNTER
[facility] Clinical Staff  Subject: Refill Request     QUESTIONS   Name of Medication? cimetidine (TAGAMET) 800 MG tablet   Patient-reported dosage and instructions? 800 mg x 2 day   How many days do you have left? 0   Preferred Pharmacy? [facility]/PHARMACY #[id number]   Pharmacy phone number (if available)? [phone]   Additional Information for Provider? patient lost her medication bottle,   and she feels like she needs a refill asap.   ---------------------------------------------------------------------------   --------------   CALL BACK INFO   What is the best way for the office to contact you? OK to leave message on   voicemail   Preferred Call Back Phone Number? [phone]   ---------------------------------------------------------------------------   --------------   SCRIPT ANSWERS   Relationship to Patient?  Self

## 2023-04-24 DIAGNOSIS — Z11.4 SCREENING FOR HIV (HUMAN IMMUNODEFICIENCY VIRUS): ICD-10-CM

## 2023-04-24 DIAGNOSIS — I10 ESSENTIAL HYPERTENSION WITH GOAL BLOOD PRESSURE LESS THAN 140/90: Primary | ICD-10-CM

## 2023-05-28 SDOH — ECONOMIC STABILITY: HOUSING INSECURITY
IN THE LAST 12 MONTHS, WAS THERE A TIME WHEN YOU DID NOT HAVE A STEADY PLACE TO SLEEP OR SLEPT IN A SHELTER (INCLUDING NOW)?: NO

## 2023-05-28 SDOH — ECONOMIC STABILITY: FOOD INSECURITY: WITHIN THE PAST 12 MONTHS, YOU WORRIED THAT YOUR FOOD WOULD RUN OUT BEFORE YOU GOT MONEY TO BUY MORE.: SOMETIMES TRUE

## 2023-05-28 SDOH — ECONOMIC STABILITY: TRANSPORTATION INSECURITY
IN THE PAST 12 MONTHS, HAS LACK OF TRANSPORTATION KEPT YOU FROM MEETINGS, WORK, OR FROM GETTING THINGS NEEDED FOR DAILY LIVING?: NO

## 2023-05-28 SDOH — ECONOMIC STABILITY: FOOD INSECURITY: WITHIN THE PAST 12 MONTHS, THE FOOD YOU BOUGHT JUST DIDN'T LAST AND YOU DIDN'T HAVE MONEY TO GET MORE.: SOMETIMES TRUE

## 2023-05-28 SDOH — ECONOMIC STABILITY: INCOME INSECURITY: HOW HARD IS IT FOR YOU TO PAY FOR THE VERY BASICS LIKE FOOD, HOUSING, MEDICAL CARE, AND HEATING?: NOT HARD AT ALL

## 2023-05-31 ENCOUNTER — OFFICE VISIT (OUTPATIENT)
Facility: CLINIC | Age: 33
End: 2023-05-31
Payer: MEDICAID

## 2023-05-31 VITALS
SYSTOLIC BLOOD PRESSURE: 127 MMHG | OXYGEN SATURATION: 99 % | DIASTOLIC BLOOD PRESSURE: 75 MMHG | BODY MASS INDEX: 39.75 KG/M2 | TEMPERATURE: 98.2 F | HEART RATE: 87 BPM | WEIGHT: 216 LBS | RESPIRATION RATE: 18 BRPM | HEIGHT: 62 IN

## 2023-05-31 DIAGNOSIS — F50.89 PICA IN ADULTS: ICD-10-CM

## 2023-05-31 DIAGNOSIS — E66.01 CLASS 2 SEVERE OBESITY DUE TO EXCESS CALORIES WITH SERIOUS COMORBIDITY AND BODY MASS INDEX (BMI) OF 39.0 TO 39.9 IN ADULT (HCC): ICD-10-CM

## 2023-05-31 DIAGNOSIS — M25.562 ACUTE PAIN OF LEFT KNEE: ICD-10-CM

## 2023-05-31 DIAGNOSIS — Z11.4 SCREENING FOR HIV (HUMAN IMMUNODEFICIENCY VIRUS): ICD-10-CM

## 2023-05-31 DIAGNOSIS — R53.83 OTHER FATIGUE: ICD-10-CM

## 2023-05-31 DIAGNOSIS — R13.19 ESOPHAGEAL DYSPHAGIA: ICD-10-CM

## 2023-05-31 DIAGNOSIS — I10 ESSENTIAL HYPERTENSION WITH GOAL BLOOD PRESSURE LESS THAN 140/90: Primary | ICD-10-CM

## 2023-05-31 DIAGNOSIS — R80.9 MICROALBUMINURIA: ICD-10-CM

## 2023-05-31 PROBLEM — E66.812 CLASS 2 SEVERE OBESITY DUE TO EXCESS CALORIES WITH SERIOUS COMORBIDITY AND BODY MASS INDEX (BMI) OF 39.0 TO 39.9 IN ADULT: Status: ACTIVE | Noted: 2023-05-31

## 2023-05-31 PROCEDURE — 3078F DIAST BP <80 MM HG: CPT | Performed by: NURSE PRACTITIONER

## 2023-05-31 PROCEDURE — 3074F SYST BP LT 130 MM HG: CPT | Performed by: NURSE PRACTITIONER

## 2023-05-31 PROCEDURE — 99214 OFFICE O/P EST MOD 30 MIN: CPT | Performed by: NURSE PRACTITIONER

## 2023-05-31 ASSESSMENT — PATIENT HEALTH QUESTIONNAIRE - PHQ9
SUM OF ALL RESPONSES TO PHQ QUESTIONS 1-9: 0
2. FEELING DOWN, DEPRESSED OR HOPELESS: 0
SUM OF ALL RESPONSES TO PHQ9 QUESTIONS 1 & 2: 0
1. LITTLE INTEREST OR PLEASURE IN DOING THINGS: 0

## 2023-05-31 NOTE — PROGRESS NOTES
Room 7    When asked if patient has any concerns she would like to address with JENNYFER Cardona patient states yes I have been having chest pain , knee pain and craving for the last week . Did patient bring someone? No     Did the patient have DME equipment? No     Did you take your medication today? Patient states not yet       1. \"Have you been to the ER, urgent care clinic since your last visit? Hospitalized since your last visit? \" No     2. \"Have you seen or consulted any other health care providers outside of the [address] [place] [address] since your last visit? \" No     3. For patients aged 39-70: Has the patient had a colonoscopy / FIT/ Cologuard? N/A      If the patient is female:    4. For patients aged 41-77: Has the patient had a mammogram within the past 2 years? N/A      5. For patients aged 21-65: Has the patient had a pap smear? {Cancer Care Gap present? Patient states I received a pap from [facility] about 2 years ago and I do not have to come back until 3 years . When asked if patient menstrual cycle is normal patient states yes .        PHQ-9  5/31/2023   Little interest or pleasure in doing things 0   Little interest or pleasure in doing things -   Feeling down, depressed, or hopeless 0   PHQ-2 Score 0   Total Score PHQ 2 -   PHQ-9 Total Score 0          Health Maintenance Due   Topic Date Due    COVID-19 Vaccine (1) Never done    Varicella vaccine (1 of 2 - 2-dose childhood series) Never done    HIV screen  Never done    DTaP/Tdap/Td vaccine (1 - Tdap) Never done    Cervical cancer screen  Never done

## 2023-05-31 NOTE — PROGRESS NOTES
[place]               [address]               [phone]      Karis Schneider is a 34 y.o. female and presents with Knee Pain, Chest Pain, and Other (Craving ice )       Assessment/Plan:    1. Essential hypertension with goal blood pressure less than 140/90  -     Comprehensive Metabolic Panel; Future  -     Comprehensive Metabolic Panel  Endorses medication compliance, Follow up labs prior to next visit, Follow-up labs today, and Blood pressure stable, continue lisinopril and amlodipine at same dose  2. Esophageal dysphagia  -     External Referral To Gastroenterology  Her description of chest pain is more consistent with non-cardiac  Pmh of esophageal dilation  Refer to GI, Dr. Fely Castro, for further evaluation  3. Acute pain of left knee  -     BS - John Cox MD, Sports Medicine, [facility])  Worsening problem, refer to ortho for further evaluation    4. Pica in adults  -     CBC; Future  -     Ferritin; Future  Endorses ice cravings and easy fatigue, check for anemia and iron levels  5. Other fatigue  -     CBC; Future  -     Ferritin; Future    6. Microalbuminuria  -     Microalbumin / Creatinine Urine Ratio; Future  Follow up lab today  7. Class 2 severe obesity due to excess calories with serious comorbidity and body mass index (BMI) of 39.0 to 39.9 in adult [facility])  Discussed importance of weight loss, handouts provided  8. Screening for HIV (human immunodeficiency virus)  -     HIV 1/2 Ag/Ab, 4TH Generation,W Rflx Confirm         Follow up and disposition:   Return in about 4 months (around 9/30/2023) for HTN, 15min, office, w/labsprior. Subjective:    Labs obtained prior to visit? No  Reviewed with patient?  N/A      Chest pain  Onset: 2 weeks ago  The problem lasted intermittently for about a week  The pain was mid sternal  Was not brought on by eating or activity  The pain felt like a tightening that fades away without intervention  Has happened

## 2023-06-01 LAB
ALBUMIN SERPL-MCNC: 4.3 G/DL (ref 3.8–4.8)
ALBUMIN/CREAT UR: 22 MG/G CREAT (ref 0–29)
ALBUMIN/GLOB SERPL: 1.3 {RATIO} (ref 1.2–2.2)
ALP SERPL-CCNC: 71 IU/L (ref 44–121)
ALT SERPL-CCNC: 17 IU/L (ref 0–32)
AST SERPL-CCNC: 17 IU/L (ref 0–40)
BILIRUB SERPL-MCNC: 0.4 MG/DL (ref 0–1.2)
BUN SERPL-MCNC: 10 MG/DL (ref 6–20)
BUN/CREAT SERPL: 11 (ref 9–23)
CALCIUM SERPL-MCNC: 9.1 MG/DL (ref 8.7–10.2)
CHLORIDE SERPL-SCNC: 102 MMOL/L (ref 96–106)
CO2 SERPL-SCNC: 21 MMOL/L (ref 20–29)
CREAT SERPL-MCNC: 0.92 MG/DL (ref 0.57–1)
CREAT UR-MCNC: 168.9 MG/DL
EGFRCR SERPLBLD CKD-EPI 2021: 84 ML/MIN/1.73
ERYTHROCYTE [DISTWIDTH] IN BLOOD BY AUTOMATED COUNT: 14.8 % (ref 11.7–15.4)
FERRITIN SERPL-MCNC: 47 NG/ML (ref 15–150)
GLOBULIN SER CALC-MCNC: 3.4 G/DL (ref 1.5–4.5)
GLUCOSE SERPL-MCNC: 79 MG/DL (ref 70–99)
HCT VFR BLD AUTO: 38.6 % (ref 34–46.6)
HGB BLD-MCNC: 12.3 G/DL (ref 11.1–15.9)
HIV 1+2 AB+HIV1 P24 AG SERPL QL IA: NON REACTIVE
MCH RBC QN AUTO: 25.9 PG (ref 26.6–33)
MCHC RBC AUTO-ENTMCNC: 31.9 G/DL (ref 31.5–35.7)
MCV RBC AUTO: 81 FL (ref 79–97)
MICROALBUMIN UR-MCNC: 36.9 UG/ML
PLATELET # BLD AUTO: 433 X10E3/UL (ref 150–450)
POTASSIUM SERPL-SCNC: 4.6 MMOL/L (ref 3.5–5.2)
PROT SERPL-MCNC: 7.7 G/DL (ref 6–8.5)
RBC # BLD AUTO: 4.74 X10E6/UL (ref 3.77–5.28)
SODIUM SERPL-SCNC: 140 MMOL/L (ref 134–144)
SPECIMEN STATUS REPORT: NORMAL
WBC # BLD AUTO: 8 X10E3/UL (ref 3.4–10.8)

## 2024-11-19 ENCOUNTER — OFFICE VISIT (OUTPATIENT)
Facility: CLINIC | Age: 34
End: 2024-11-19
Payer: MEDICAID

## 2024-11-19 VITALS
BODY MASS INDEX: 38.94 KG/M2 | DIASTOLIC BLOOD PRESSURE: 74 MMHG | RESPIRATION RATE: 18 BRPM | SYSTOLIC BLOOD PRESSURE: 111 MMHG | WEIGHT: 211.6 LBS | HEART RATE: 83 BPM | TEMPERATURE: 98.7 F | OXYGEN SATURATION: 98 % | HEIGHT: 62 IN

## 2024-11-19 DIAGNOSIS — R73.9 ELEVATED SERUM GLUCOSE: ICD-10-CM

## 2024-11-19 DIAGNOSIS — I10 ESSENTIAL HYPERTENSION WITH GOAL BLOOD PRESSURE LESS THAN 140/90: Primary | ICD-10-CM

## 2024-11-19 PROCEDURE — 3078F DIAST BP <80 MM HG: CPT | Performed by: NURSE PRACTITIONER

## 2024-11-19 PROCEDURE — 99214 OFFICE O/P EST MOD 30 MIN: CPT | Performed by: NURSE PRACTITIONER

## 2024-11-19 PROCEDURE — 3074F SYST BP LT 130 MM HG: CPT | Performed by: NURSE PRACTITIONER

## 2024-11-19 RX ORDER — LISINOPRIL 20 MG/1
20 TABLET ORAL DAILY
Qty: 90 TABLET | Refills: 1 | Status: SHIPPED | OUTPATIENT
Start: 2024-11-19

## 2024-11-19 RX ORDER — AMLODIPINE BESYLATE 10 MG/1
10 TABLET ORAL DAILY
Qty: 90 TABLET | Refills: 1 | Status: SHIPPED | OUTPATIENT
Start: 2024-11-19

## 2024-11-19 RX ORDER — LISINOPRIL 10 MG/1
10 TABLET ORAL DAILY
Qty: 30 TABLET | Status: CANCELLED | OUTPATIENT
Start: 2024-11-19

## 2024-11-19 RX ORDER — LISINOPRIL 20 MG/1
20 TABLET ORAL DAILY
COMMUNITY
Start: 2024-11-02 | End: 2024-11-19 | Stop reason: SDUPTHER

## 2024-11-19 SDOH — ECONOMIC STABILITY: FOOD INSECURITY: WITHIN THE PAST 12 MONTHS, THE FOOD YOU BOUGHT JUST DIDN'T LAST AND YOU DIDN'T HAVE MONEY TO GET MORE.: NEVER TRUE

## 2024-11-19 SDOH — ECONOMIC STABILITY: FOOD INSECURITY: WITHIN THE PAST 12 MONTHS, YOU WORRIED THAT YOUR FOOD WOULD RUN OUT BEFORE YOU GOT MONEY TO BUY MORE.: NEVER TRUE

## 2024-11-19 SDOH — ECONOMIC STABILITY: INCOME INSECURITY: HOW HARD IS IT FOR YOU TO PAY FOR THE VERY BASICS LIKE FOOD, HOUSING, MEDICAL CARE, AND HEATING?: NOT HARD AT ALL

## 2024-11-19 ASSESSMENT — PATIENT HEALTH QUESTIONNAIRE - PHQ9
SUM OF ALL RESPONSES TO PHQ QUESTIONS 1-9: 1
SUM OF ALL RESPONSES TO PHQ QUESTIONS 1-9: 1
SUM OF ALL RESPONSES TO PHQ9 QUESTIONS 1 & 2: 1
SUM OF ALL RESPONSES TO PHQ QUESTIONS 1-9: 1
1. LITTLE INTEREST OR PLEASURE IN DOING THINGS: SEVERAL DAYS
SUM OF ALL RESPONSES TO PHQ QUESTIONS 1-9: 1
2. FEELING DOWN, DEPRESSED OR HOPELESS: NOT AT ALL

## 2024-11-19 NOTE — PROGRESS NOTES
Patient declined flu vaccine     Rae Mcdaniel presents today for   Chief Complaint   Patient presents with    Follow-up    Hypertension    Anxiety       Is someone accompanying this pt? No     Is the patient using any DME equipment during OV? No     Depression Screening:       No data to display                Learning Assessment:  Failed to redirect to the Timeline version of the REVFS SmartLink.    Health Maintenance reviewed and discussed and ordered per Provider.    Health Maintenance Due   Topic Date Due    Varicella vaccine (1 of 2 - 13+ 2-dose series) Never done    Hepatitis B vaccine (1 of 3 - 19+ 3-dose series) Never done    DTaP/Tdap/Td vaccine (1 - Tdap) Never done    Cervical cancer screen  Never done    Depression Screen  05/31/2024    Flu vaccine (1) 08/01/2024    COVID-19 Vaccine (1 - 2023-24 season) Never done   .    \"Have you been to the ER, urgent care clinic since your last visit?  Hospitalized since your last visit?\"    Yes 10/30 dizziness SNROB     “Have you seen or consulted any other health care providers outside our system since your last visit?”    No      “Have you had a pap smear?”    Yes [facility]     No cervical cancer screening on file                      
amLODIPine (NORVASC) 10 MG tablet TAKE 1 TABLET BY MOUTH DAILY               ROS:     Review of Systems  As stated in HPI, otherwise all others negative.       The problem list was updated as a part of today's visit.  Patient Active Problem List   Diagnosis    GERD (gastroesophageal reflux disease)    Microalbuminuria    Essential hypertension with goal blood pressure less than 140/90    Esophageal dysphagia    Class 2 severe obesity due to excess calories with serious comorbidity and body mass index (BMI) of 39.0 to 39.9 in adult       The PS, FH were reviewed.      SH:  Social History     Tobacco Use    Smoking status: Never    Smokeless tobacco: Never   Substance Use Topics    Alcohol use: No    Drug use: No       Medications/Allergies:  Current Outpatient Medications on File Prior to Visit   Medication Sig Dispense Refill    cimetidine (TAGAMET) 800 MG tablet Take 1 tablet by mouth 2 times daily 180 tablet 1    amLODIPine (NORVASC) 10 MG tablet TAKE 1 TABLET BY MOUTH DAILY      lisinopril (PRINIVIL;ZESTRIL) 10 MG tablet Take 1 tablet by mouth daily       No current facility-administered medications on file prior to visit.        No Known Allergies    Objective:  /74   Pulse 83   Temp 98.7 °F (37.1 °C) (Temporal)   Resp 18   Ht 1.575 m (5' 2\")   Wt 96 kg (211 lb 9.6 oz)   SpO2 98%   BMI 38.70 kg/m²  Body mass index is 38.7 kg/m².    Physical assessment  Physical Exam            I have discussed the diagnosis with the patient and the intended plan as seen in the above orders.  The patient has received an After-Visit Summary and questions were answered concerning future plans.     An After Visit Summary was printed and given to the patient.    All diagnosis have been discussed with the patient and all of the patient's questions have been answered.           Mariama Perry, [facility]  [address] [place] [address].

## 2025-04-09 ENCOUNTER — OFFICE VISIT (OUTPATIENT)
Facility: CLINIC | Age: 35
End: 2025-04-09
Payer: MEDICAID

## 2025-04-09 VITALS
HEIGHT: 62 IN | DIASTOLIC BLOOD PRESSURE: 104 MMHG | OXYGEN SATURATION: 96 % | SYSTOLIC BLOOD PRESSURE: 178 MMHG | RESPIRATION RATE: 16 BRPM | HEART RATE: 78 BPM | BODY MASS INDEX: 39.64 KG/M2 | WEIGHT: 215.4 LBS

## 2025-04-09 DIAGNOSIS — R73.9 ELEVATED SERUM GLUCOSE: ICD-10-CM

## 2025-04-09 DIAGNOSIS — J30.2 SEASONAL ALLERGIC REACTION: ICD-10-CM

## 2025-04-09 DIAGNOSIS — I10 ESSENTIAL HYPERTENSION WITH GOAL BLOOD PRESSURE LESS THAN 140/90: Primary | ICD-10-CM

## 2025-04-09 PROCEDURE — 3080F DIAST BP >= 90 MM HG: CPT | Performed by: NURSE PRACTITIONER

## 2025-04-09 PROCEDURE — 99213 OFFICE O/P EST LOW 20 MIN: CPT | Performed by: NURSE PRACTITIONER

## 2025-04-09 PROCEDURE — 3077F SYST BP >= 140 MM HG: CPT | Performed by: NURSE PRACTITIONER

## 2025-04-09 RX ORDER — LISINOPRIL 20 MG/1
20 TABLET ORAL DAILY
Qty: 90 TABLET | Refills: 0 | Status: SHIPPED | OUTPATIENT
Start: 2025-04-09

## 2025-04-09 RX ORDER — AMLODIPINE BESYLATE 10 MG/1
10 TABLET ORAL DAILY
Qty: 90 TABLET | Refills: 0 | Status: SHIPPED | OUTPATIENT
Start: 2025-04-09

## 2025-04-09 RX ORDER — CETIRIZINE HYDROCHLORIDE 10 MG/1
10 TABLET ORAL DAILY
Qty: 90 TABLET | Refills: 1 | Status: SHIPPED | OUTPATIENT
Start: 2025-04-09

## 2025-04-09 SDOH — ECONOMIC STABILITY: FOOD INSECURITY: WITHIN THE PAST 12 MONTHS, YOU WORRIED THAT YOUR FOOD WOULD RUN OUT BEFORE YOU GOT MONEY TO BUY MORE.: NEVER TRUE

## 2025-04-09 SDOH — ECONOMIC STABILITY: FOOD INSECURITY: WITHIN THE PAST 12 MONTHS, THE FOOD YOU BOUGHT JUST DIDN'T LAST AND YOU DIDN'T HAVE MONEY TO GET MORE.: NEVER TRUE

## 2025-04-09 ASSESSMENT — PATIENT HEALTH QUESTIONNAIRE - PHQ9
SUM OF ALL RESPONSES TO PHQ QUESTIONS 1-9: 0
SUM OF ALL RESPONSES TO PHQ QUESTIONS 1-9: 0
1. LITTLE INTEREST OR PLEASURE IN DOING THINGS: NOT AT ALL
SUM OF ALL RESPONSES TO PHQ QUESTIONS 1-9: 0
2. FEELING DOWN, DEPRESSED OR HOPELESS: NOT AT ALL
SUM OF ALL RESPONSES TO PHQ QUESTIONS 1-9: 0

## 2025-04-09 NOTE — PROGRESS NOTES
[address] [place] [address]               [phone]      Rae Mcdaniel is a 34 y.o. female and presents with Hypertension       Assessment/Plan:    1. Essential hypertension with goal blood pressure less than 140/90  -     amLODIPine (NORVASC) 10 MG tablet; Take 1 tablet by mouth daily, Disp-90 tablet, R-0Normal  -     lisinopril (PRINIVIL;ZESTRIL) 20 MG tablet; Take 1 tablet by mouth daily, Disp-90 tablet, R-0Normal  2. Seasonal allergic reaction  -     cetirizine (ZYRTEC) 10 MG tablet; Take 1 tablet by mouth daily, Disp-90 tablet, R-1Normal   Has not had blood pressure medication for \"a while\", blood pressure elevated, refills provided, will have her follow up in one week for blood pressure recheck  Medication for seasonal allergies ordered  Labs prior to next visit  Patient verbalized understanding and is in agreement with this plan of care          Follow up and disposition:   Return in about 1 week (around 4/16/2025) for NV, b/pcheck, AND, 3month, Physical, w/gyn, HTN, 30min, office, w/labsprior.      Subjective:    Labs obtained prior to visit? No  Reviewed with patient? N/A    Hypertension:  BP (!) 178/104   Pulse 78   Resp 16   Ht 1.575 m (5' 2\")   Wt 97.7 kg (215 lb 6.4 oz)   LMP 03/15/2025 (Approximate)   SpO2 96%   BMI 39.40 kg/m²    Patient reports taking medications as instructed. No: ran out and did not get refilled, has not had for \"a while\"   Medication side effects noted no  Headache upon wakening.no   Home BP monitoring: no  Do you experience chest pain/pressure or SOB with exertion? no  Maintain a low Sodium diet? Yes  Lab Results   Component Value Date/Time    BUN 10 05/31/2023 10:04 AM    CREATININE 0.92 05/31/2023 10:04 AM    LABGLOM 84 05/31/2023 10:04 AM    K 4.6 05/31/2023 10:04 AM     Hypertension Medications       ACE Inhibitors       lisinopril (PRINIVIL;ZESTRIL) 20 MG tablet Take 1 tablet by mouth daily       Calcium Channel Blockers

## 2025-04-16 ENCOUNTER — CLINICAL SUPPORT (OUTPATIENT)
Facility: CLINIC | Age: 35
End: 2025-04-16

## 2025-04-16 VITALS
TEMPERATURE: 98 F | HEART RATE: 83 BPM | RESPIRATION RATE: 18 BRPM | SYSTOLIC BLOOD PRESSURE: 126 MMHG | OXYGEN SATURATION: 100 % | DIASTOLIC BLOOD PRESSURE: 88 MMHG

## 2025-04-16 DIAGNOSIS — I10 ESSENTIAL HYPERTENSION WITH GOAL BLOOD PRESSURE LESS THAN 140/90: Primary | ICD-10-CM

## 2025-04-16 NOTE — PROGRESS NOTES
Here today for blood pressure recheck.  Blood pressure today 126/88. Much better since restarting amlodipine and lisinopril.  Continue same.  Patient verbalized understanding and is in agreement with this plan of care